# Patient Record
Sex: MALE | Race: WHITE | Employment: OTHER | URBAN - METROPOLITAN AREA
[De-identification: names, ages, dates, MRNs, and addresses within clinical notes are randomized per-mention and may not be internally consistent; named-entity substitution may affect disease eponyms.]

---

## 2018-07-07 ENCOUNTER — OFFICE VISIT (OUTPATIENT)
Dept: FAMILY MEDICINE CLINIC | Facility: CLINIC | Age: 58
End: 2018-07-07

## 2018-07-07 VITALS
SYSTOLIC BLOOD PRESSURE: 120 MMHG | HEIGHT: 71 IN | RESPIRATION RATE: 16 BRPM | HEART RATE: 66 BPM | BODY MASS INDEX: 31.08 KG/M2 | OXYGEN SATURATION: 98 % | TEMPERATURE: 99 F | WEIGHT: 222 LBS | DIASTOLIC BLOOD PRESSURE: 80 MMHG

## 2018-07-07 DIAGNOSIS — H10.13 ALLERGIC CONJUNCTIVITIS OF BOTH EYES: Primary | ICD-10-CM

## 2018-07-07 PROCEDURE — 99202 OFFICE O/P NEW SF 15 MIN: CPT | Performed by: NURSE PRACTITIONER

## 2018-07-07 RX ORDER — AZELASTINE HYDROCHLORIDE 0.5 MG/ML
1 SOLUTION/ DROPS OPHTHALMIC 2 TIMES DAILY
Qty: 1 BOTTLE | Refills: 0 | Status: SHIPPED | OUTPATIENT
Start: 2018-07-07

## 2018-07-07 NOTE — PROGRESS NOTES
CHIEF COMPLAINT:   Patient presents with:  Eye Problem      HPI:   Summer Sánchez is a 62year old male who presents with chief complaint of \"pink itchy eyes\". Symptoms began  1  months ago. Symptoms have been unchanged since onset.    Patient reports efraín B Complex Vitamins (VITAMIN B COMPLEX OR) Take 50 mg by mouth 2 (two) times daily. Disp:  Rfl:    Biotin 10 MG Oral Cap Take  by mouth 2 (two) times daily. Disp:  Rfl:    Coenzyme Q10 (Q-10 CO-ENZYME OR) Take 80 mg by mouth daily.  Disp:  Rfl:    Magnesium SKIN: no rashes  EYES:denies blurred vision or double vision.  See HPI  HENT: denies ear pain, congestion, sore throat  LUNGS: denies shortness of breath or cough  CARDIOVASCULAR: denies chest pain or palpitations   GI: denies N/V/C or abdominal pain  NEURO Conjunctivitis is an irritation of a thin membrane in the eye. This membrane is called the conjunctiva. It covers the white of the eye and the inside of the eyelid. The condition is often called pink eye or red eye because the eye looks pink or red.  The ey © 4481-0586 [employer] [address] [facility] [place] [facility] [place]. All rights reserved. This information is not intended as a substitute for professional medical care. Always follow your healthcare professional's instructions.               C

## 2018-07-07 NOTE — PATIENT INSTRUCTIONS
Conjunctivitis, Allergic    Conjunctivitis is an irritation of a thin membrane in the eye. This membrane is called the conjunctiva. It covers the white of the eye and the inside of the eyelid.  The condition is often called pink eye or red eye because the © 4567-3942 [employer] [address] 140[address] [facility] [place], [facility] [place]. All rights reserved. This information is not intended as a substitute for professional medical care. Always follow your healthcare professional's instructions.

## 2022-04-28 ENCOUNTER — OFFICE VISIT (OUTPATIENT)
Dept: FAMILY MEDICINE CLINIC | Facility: CLINIC | Age: 62
End: 2022-04-28
Payer: COMMERCIAL

## 2022-04-28 DIAGNOSIS — J45.41 MODERATE PERSISTENT ASTHMA WITH ACUTE EXACERBATION: ICD-10-CM

## 2022-04-28 DIAGNOSIS — J01.40 ACUTE NON-RECURRENT PANSINUSITIS: Primary | ICD-10-CM

## 2022-04-28 PROCEDURE — 3074F SYST BP LT 130 MM HG: CPT | Performed by: NURSE PRACTITIONER

## 2022-04-28 PROCEDURE — 99203 OFFICE O/P NEW LOW 30 MIN: CPT | Performed by: NURSE PRACTITIONER

## 2022-04-28 PROCEDURE — 3079F DIAST BP 80-89 MM HG: CPT | Performed by: NURSE PRACTITIONER

## 2022-04-28 RX ORDER — AMOXICILLIN AND CLAVULANATE POTASSIUM 875; 125 MG/1; MG/1
1 TABLET, FILM COATED ORAL 2 TIMES DAILY
Qty: 20 TABLET | Refills: 0 | Status: SHIPPED | OUTPATIENT
Start: 2022-04-28 | End: 2022-05-08

## 2022-04-29 VITALS
TEMPERATURE: 100 F | OXYGEN SATURATION: 98 % | DIASTOLIC BLOOD PRESSURE: 86 MMHG | HEART RATE: 104 BPM | RESPIRATION RATE: 16 BRPM | SYSTOLIC BLOOD PRESSURE: 128 MMHG

## 2022-04-29 LAB — SARS-COV-2 RNA RESP QL NAA+PROBE: NOT DETECTED

## 2023-10-12 ENCOUNTER — APPOINTMENT (OUTPATIENT)
Dept: GENERAL RADIOLOGY | Facility: HOSPITAL | Age: 63
End: 2023-10-12
Attending: EMERGENCY MEDICINE
Payer: COMMERCIAL

## 2023-10-12 ENCOUNTER — HOSPITAL ENCOUNTER (EMERGENCY)
Facility: HOSPITAL | Age: 63
Discharge: HOME | End: 2023-10-12
Attending: EMERGENCY MEDICINE
Payer: COMMERCIAL

## 2023-10-12 VITALS
HEIGHT: 71 IN | WEIGHT: 190 LBS | OXYGEN SATURATION: 94 % | TEMPERATURE: 99 F | RESPIRATION RATE: 14 BRPM | SYSTOLIC BLOOD PRESSURE: 156 MMHG | HEART RATE: 102 BPM | BODY MASS INDEX: 26.6 KG/M2 | DIASTOLIC BLOOD PRESSURE: 100 MMHG

## 2023-10-12 DIAGNOSIS — S09.90XA INJURY OF HEAD, INITIAL ENCOUNTER: ICD-10-CM

## 2023-10-12 DIAGNOSIS — S51.012A ELBOW LACERATION, LEFT, INITIAL ENCOUNTER: Primary | ICD-10-CM
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
## 2023-10-12 PROCEDURE — 99284 EMERGENCY DEPT VISIT MOD MDM: CPT

## 2023-10-12 PROCEDURE — 12002 RPR S/N/AX/GEN/TRNK2.6-7.5CM: CPT

## 2023-10-12 PROCEDURE — 99283 EMERGENCY DEPT VISIT LOW MDM: CPT

## 2023-10-12 PROCEDURE — 73080 X-RAY EXAM OF ELBOW: CPT | Performed by: EMERGENCY MEDICINE

## 2023-10-12 RX ORDER — CEFADROXIL 500 MG/1
500 CAPSULE ORAL 2 TIMES DAILY
Qty: 14 CAPSULE | Refills: 0 | Status: SHIPPED | OUTPATIENT
Start: 2023-10-12 | End: 2023-10-19

## 2023-10-12 RX ORDER — LISINOPRIL 2.5 MG/1
2.5 TABLET ORAL DAILY
COMMUNITY
Start: 2023-09-01

## 2023-10-12 RX ORDER — TAMSULOSIN HYDROCHLORIDE 0.4 MG/1
CAPSULE ORAL
COMMUNITY
Start: 2023-09-01

## 2023-10-12 RX ORDER — FLUTICASONE PROPIONATE AND SALMETEROL 500; 50 UG/1; UG/1
1 POWDER RESPIRATORY (INHALATION) EVERY 12 HOURS
COMMUNITY
Start: 2023-09-01

## 2023-10-12 RX ORDER — MELOXICAM 15 MG/1
15 TABLET ORAL DAILY
COMMUNITY
Start: 2023-07-02

## 2023-10-12 NOTE — ED INITIAL ASSESSMENT (HPI)
Pt here due to a fall. Pt was working on a fireplace and was on a ladder about 2-4 feet in the air and the tarp that the ladder was on slipped out. Pt his his head and left elbow on the way down. Pt went to the  and was told to come in due to the wound being worse then they can handle at the IC. X-rays done at the IC the CD was sent to radiology to up load.

## 2025-05-31 ENCOUNTER — APPOINTMENT (OUTPATIENT)
Dept: CT IMAGING | Facility: HOSPITAL | Age: 65
End: 2025-05-31
Attending: EMERGENCY MEDICINE
Payer: COMMERCIAL

## 2025-05-31 ENCOUNTER — HOSPITAL ENCOUNTER (OUTPATIENT)
Facility: HOSPITAL | Age: 65
Setting detail: OBSERVATION
Discharge: HOME | End: 2025-06-01
Attending: EMERGENCY MEDICINE | Admitting: HOSPITALIST
Payer: COMMERCIAL

## 2025-05-31 DIAGNOSIS — N20.0 KIDNEY STONE ON LEFT SIDE: ICD-10-CM

## 2025-05-31 DIAGNOSIS — N20.1 URETEROLITHIASIS: Primary | ICD-10-CM

## 2025-05-31 LAB
ALBUMIN SERPL-MCNC: 5.1 G/DL (ref 3.2–4.8)
ALBUMIN/GLOB SERPL: 2 {RATIO} (ref 1–2)
ALP LIVER SERPL-CCNC: 91 U/L (ref 45–117)
ALT SERPL-CCNC: 37 U/L (ref 10–49)
ANION GAP SERPL CALC-SCNC: 10 MMOL/L (ref 0–18)
AST SERPL-CCNC: 26 U/L (ref ?–34)
ATRIAL RATE: 59 BPM
BASOPHILS # BLD AUTO: 0.06 X10(3) UL (ref 0–0.2)
BASOPHILS NFR BLD AUTO: 0.6 %
BILIRUB SERPL-MCNC: 0.7 MG/DL (ref 0.2–1.1)
BILIRUB UR QL STRIP.AUTO: NEGATIVE
BUN BLD-MCNC: 17 MG/DL (ref 9–23)
CALCIUM BLD-MCNC: 10.4 MG/DL (ref 8.7–10.6)
CHLORIDE SERPL-SCNC: 104 MMOL/L (ref 98–112)
CLARITY UR REFRACT.AUTO: CLEAR
CO2 SERPL-SCNC: 26 MMOL/L (ref 21–32)
CREAT BLD-MCNC: 1.17 MG/DL (ref 0.7–1.3)
EGFRCR SERPLBLD CKD-EPI 2021: 69 ML/MIN/1.73M2 (ref 60–?)
EOSINOPHIL # BLD AUTO: 0.62 X10(3) UL (ref 0–0.7)
EOSINOPHIL NFR BLD AUTO: 6.6 %
ERYTHROCYTE [DISTWIDTH] IN BLOOD BY AUTOMATED COUNT: 13.1 %
EST. AVERAGE GLUCOSE BLD GHB EST-MCNC: 140 MG/DL (ref 68–126)
GLOBULIN PLAS-MCNC: 2.5 G/DL (ref 2–3.5)
GLUCOSE BLD-MCNC: 161 MG/DL (ref 70–99)
GLUCOSE BLD-MCNC: 178 MG/DL (ref 70–99)
GLUCOSE BLD-MCNC: 178 MG/DL (ref 70–99)
GLUCOSE BLD-MCNC: 193 MG/DL (ref 70–99)
GLUCOSE UR STRIP.AUTO-MCNC: 150 MG/DL
HBA1C MFR BLD: 6.5 % (ref ?–5.7)
HCT VFR BLD AUTO: 49.8 % (ref 39–53)
HGB BLD-MCNC: 17.3 G/DL (ref 13–17.5)
IMM GRANULOCYTES # BLD AUTO: 0.03 X10(3) UL (ref 0–1)
IMM GRANULOCYTES NFR BLD: 0.3 %
KETONES UR STRIP.AUTO-MCNC: 20 MG/DL
LEUKOCYTE ESTERASE UR QL STRIP.AUTO: NEGATIVE
LIPASE SERPL-CCNC: 35 U/L (ref 12–53)
LYMPHOCYTES # BLD AUTO: 2.27 X10(3) UL (ref 1–4)
LYMPHOCYTES NFR BLD AUTO: 24.2 %
MCH RBC QN AUTO: 32 PG (ref 26–34)
MCHC RBC AUTO-ENTMCNC: 34.7 G/DL (ref 31–37)
MCV RBC AUTO: 92.2 FL (ref 80–100)
MONOCYTES # BLD AUTO: 0.56 X10(3) UL (ref 0.1–1)
MONOCYTES NFR BLD AUTO: 6 %
NEUTROPHILS # BLD AUTO: 5.83 X10 (3) UL (ref 1.5–7.7)
NEUTROPHILS # BLD AUTO: 5.83 X10(3) UL (ref 1.5–7.7)
NEUTROPHILS NFR BLD AUTO: 62.3 %
NITRITE UR QL STRIP.AUTO: NEGATIVE
OSMOLALITY SERPL CALC.SUM OF ELEC: 295 MOSM/KG (ref 275–295)
P AXIS: 44 DEGREES
P-R INTERVAL: 170 MS
PH UR STRIP.AUTO: 6.5 [PH] (ref 5–8)
PLATELET # BLD AUTO: 248 10(3)UL (ref 150–450)
POTASSIUM SERPL-SCNC: 4.6 MMOL/L (ref 3.5–5.1)
PROT SERPL-MCNC: 7.6 G/DL (ref 5.7–8.2)
PROT UR STRIP.AUTO-MCNC: NEGATIVE MG/DL
Q-T INTERVAL: 400 MS
QRS DURATION: 138 MS
QTC CALCULATION (BEZET): 396 MS
R AXIS: -63 DEGREES
RBC # BLD AUTO: 5.4 X10(6)UL (ref 3.8–5.8)
RBC UR QL AUTO: NEGATIVE
SODIUM SERPL-SCNC: 140 MMOL/L (ref 136–145)
SP GR UR STRIP.AUTO: 1.01 (ref 1–1.03)
T AXIS: 38 DEGREES
UROBILINOGEN UR STRIP.AUTO-MCNC: NORMAL MG/DL
VENTRICULAR RATE: 59 BPM
WBC # BLD AUTO: 9.4 X10(3) UL (ref 4–11)

## 2025-05-31 PROCEDURE — 74177 CT ABD & PELVIS W/CONTRAST: CPT | Performed by: EMERGENCY MEDICINE

## 2025-05-31 PROCEDURE — 99223 1ST HOSP IP/OBS HIGH 75: CPT | Performed by: HOSPITALIST

## 2025-05-31 RX ORDER — MORPHINE SULFATE 2 MG/ML
1 INJECTION, SOLUTION INTRAMUSCULAR; INTRAVENOUS EVERY 2 HOUR PRN
Status: DISCONTINUED | OUTPATIENT
Start: 2025-05-31 | End: 2025-06-01

## 2025-05-31 RX ORDER — NICOTINE POLACRILEX 4 MG
15 LOZENGE BUCCAL
Status: DISCONTINUED | OUTPATIENT
Start: 2025-05-31 | End: 2025-06-01

## 2025-05-31 RX ORDER — DEXTROSE MONOHYDRATE 25 G/50ML
50 INJECTION, SOLUTION INTRAVENOUS
Status: DISCONTINUED | OUTPATIENT
Start: 2025-05-31 | End: 2025-06-01

## 2025-05-31 RX ORDER — MORPHINE SULFATE 2 MG/ML
2 INJECTION, SOLUTION INTRAMUSCULAR; INTRAVENOUS EVERY 2 HOUR PRN
Status: DISCONTINUED | OUTPATIENT
Start: 2025-05-31 | End: 2025-06-01

## 2025-05-31 RX ORDER — ONDANSETRON 2 MG/ML
4 INJECTION INTRAMUSCULAR; INTRAVENOUS EVERY 4 HOURS PRN
Status: DISCONTINUED | OUTPATIENT
Start: 2025-05-31 | End: 2025-05-31

## 2025-05-31 RX ORDER — MORPHINE SULFATE 4 MG/ML
4 INJECTION, SOLUTION INTRAMUSCULAR; INTRAVENOUS EVERY 2 HOUR PRN
Status: DISCONTINUED | OUTPATIENT
Start: 2025-05-31 | End: 2025-06-01

## 2025-05-31 RX ORDER — HYDROCODONE BITARTRATE AND ACETAMINOPHEN 5; 325 MG/1; MG/1
1 TABLET ORAL EVERY 4 HOURS PRN
Status: DISCONTINUED | OUTPATIENT
Start: 2025-05-31 | End: 2025-06-01

## 2025-05-31 RX ORDER — KETOROLAC TROMETHAMINE 15 MG/ML
15 INJECTION, SOLUTION INTRAMUSCULAR; INTRAVENOUS EVERY 6 HOURS PRN
Status: DISCONTINUED | OUTPATIENT
Start: 2025-05-31 | End: 2025-06-01

## 2025-05-31 RX ORDER — HYDRALAZINE HYDROCHLORIDE 20 MG/ML
10 INJECTION INTRAMUSCULAR; INTRAVENOUS EVERY 6 HOURS PRN
Status: DISCONTINUED | OUTPATIENT
Start: 2025-05-31 | End: 2025-06-01

## 2025-05-31 RX ORDER — METOCLOPRAMIDE HYDROCHLORIDE 5 MG/ML
10 INJECTION INTRAMUSCULAR; INTRAVENOUS EVERY 8 HOURS PRN
Status: DISCONTINUED | OUTPATIENT
Start: 2025-05-31 | End: 2025-05-31

## 2025-05-31 RX ORDER — HYDROCODONE BITARTRATE AND ACETAMINOPHEN 5; 325 MG/1; MG/1
2 TABLET ORAL EVERY 4 HOURS PRN
Status: DISCONTINUED | OUTPATIENT
Start: 2025-05-31 | End: 2025-06-01

## 2025-05-31 RX ORDER — KETOROLAC TROMETHAMINE 15 MG/ML
15 INJECTION, SOLUTION INTRAMUSCULAR; INTRAVENOUS ONCE
Status: COMPLETED | OUTPATIENT
Start: 2025-05-31 | End: 2025-05-31

## 2025-05-31 RX ORDER — ONDANSETRON 2 MG/ML
4 INJECTION INTRAMUSCULAR; INTRAVENOUS ONCE
Status: COMPLETED | OUTPATIENT
Start: 2025-05-31 | End: 2025-05-31

## 2025-05-31 RX ORDER — PROCHLORPERAZINE EDISYLATE 5 MG/ML
10 INJECTION INTRAMUSCULAR; INTRAVENOUS EVERY 6 HOURS PRN
Status: DISCONTINUED | OUTPATIENT
Start: 2025-05-31 | End: 2025-06-01

## 2025-05-31 RX ORDER — ACETAMINOPHEN 325 MG/1
650 TABLET ORAL EVERY 4 HOURS PRN
Status: DISCONTINUED | OUTPATIENT
Start: 2025-05-31 | End: 2025-06-01

## 2025-05-31 RX ORDER — TAMSULOSIN HYDROCHLORIDE 0.4 MG/1
0.4 CAPSULE ORAL NIGHTLY
Status: DISCONTINUED | OUTPATIENT
Start: 2025-05-31 | End: 2025-06-01

## 2025-05-31 RX ORDER — DIPHENHYDRAMINE HYDROCHLORIDE 50 MG/ML
25 INJECTION, SOLUTION INTRAMUSCULAR; INTRAVENOUS ONCE
Status: COMPLETED | OUTPATIENT
Start: 2025-05-31 | End: 2025-05-31

## 2025-05-31 RX ORDER — ONDANSETRON 2 MG/ML
4 INJECTION INTRAMUSCULAR; INTRAVENOUS EVERY 6 HOURS PRN
Status: DISCONTINUED | OUTPATIENT
Start: 2025-05-31 | End: 2025-06-01

## 2025-05-31 RX ORDER — SODIUM CHLORIDE 9 MG/ML
INJECTION, SOLUTION INTRAVENOUS CONTINUOUS
Status: DISCONTINUED | OUTPATIENT
Start: 2025-05-31 | End: 2025-06-01

## 2025-05-31 RX ORDER — SCOPOLAMINE 1 MG/3D
1 PATCH, EXTENDED RELEASE TRANSDERMAL
Status: DISCONTINUED | OUTPATIENT
Start: 2025-05-31 | End: 2025-06-01

## 2025-05-31 RX ORDER — NICOTINE POLACRILEX 4 MG
30 LOZENGE BUCCAL
Status: DISCONTINUED | OUTPATIENT
Start: 2025-05-31 | End: 2025-06-01

## 2025-05-31 RX ORDER — FLUTICASONE PROPIONATE AND SALMETEROL 500; 50 UG/1; UG/1
1 POWDER RESPIRATORY (INHALATION) EVERY 12 HOURS
Status: DISCONTINUED | OUTPATIENT
Start: 2025-05-31 | End: 2025-06-01

## 2025-05-31 RX ORDER — ONDANSETRON 2 MG/ML
INJECTION INTRAMUSCULAR; INTRAVENOUS
Status: COMPLETED
Start: 2025-05-31 | End: 2025-05-31

## 2025-05-31 RX ORDER — TAMSULOSIN HYDROCHLORIDE 0.4 MG/1
0.4 CAPSULE ORAL ONCE
Status: DISCONTINUED | OUTPATIENT
Start: 2025-05-31 | End: 2025-05-31

## 2025-05-31 RX ORDER — METOCLOPRAMIDE HYDROCHLORIDE 5 MG/ML
5 INJECTION INTRAMUSCULAR; INTRAVENOUS ONCE
Status: COMPLETED | OUTPATIENT
Start: 2025-05-31 | End: 2025-05-31

## 2025-05-31 NOTE — PROGRESS NOTES
Alert & oriented x4. NSR on tele. Hypertensive - PRN hydralazine given. Voids, straining urine. Regular diet, NPO at midnight. Biggest complaint is nausea - PRN meds given in ED. Ambulates with standby assistance. Denies chest pain/SOB. Pain controlled with PRN toradol. Patient and wife updated on plan of care. Questions and concerns addressed. Frequent rounding performed

## 2025-05-31 NOTE — H&P
[facility]     Jace Umana Patient Status:  Emergency    3/16/1960 MRN [id number]   Location [facility] EMERGENCY DEPARTMENT Attending Lucian Novak*   Hosp Day # 0 PCP DANYA CHANG     Chief Complaint: flank pain and intractable nausea and vomiting    Subjective:    History of Present Illness:     Jace Umana is a 65 year old male with medical history of DM type II on metformin, BPH and asthma who presents to the ER with complaints of flank pain and intractable nausea and vomiting.  He reports he has had intermittent left sided flank pain which lasts about 5 minutes and then resolves without intervention.  Today he started having intractable nausea and vomiting associated with his pain. He denies dysuria but notes his urine is darker in color.  He denies headaches, fever, chills, abdominal pain.  Currently his flank pain has resolved but he is having ongoing nausea     History/Other:    Past Medical History:  Past Medical History[1]  Past Surgical History:   Past Surgical History[2]   Family History:   Family History[3]  Social History:    reports that he quit smoking about 15 years ago. His smoking use included cigarettes. He started smoking about 45 years ago. He has a 30 pack-year smoking history. He has never used smokeless tobacco. He reports that he does not currently use alcohol. He reports current drug use. Drug: Cannabis.     Allergies: Allergies[4]    Medications:  Medications Ordered Prior to Encounter[5]    Review of Systems:   A comprehensive review of systems was completed.    Pertinent positives and negatives noted in the HPI.    Objective:   Physical Exam:    BP (!) 168/99   Pulse 60   Temp 97.6 °F (36.4 °C) (Oral)   Resp 15   Ht 6' (1.829 m)   Wt 190 lb (86.2 kg)   SpO2 100%   BMI 25.77 kg/m²   General: No acute distress, Alert  Respiratory: No rhonchi, no wheezes  Cardiovascular: S1, S2. Regular rate and rhythm  Abdomen: Soft,  Non-tender, non-distended, positive bowel sounds  Neuro: No new focal deficits  Extremities: No edema      Results:    Labs:      Labs Last 24 Hours:    Recent Labs   Lab 05/31/25  1143   RBC 5.40   HGB 17.3   HCT 49.8   MCV 92.2   MCH 32.0   MCHC 34.7   RDW 13.1   NEPRELIM 5.83   WBC 9.4   .0       Recent Labs   Lab 05/31/25  1143   *   BUN 17   CREATSERUM 1.17   EGFRCR 69   CA 10.4   ALB 5.1*      K 4.6      CO2 26.0   ALKPHO 91   AST 26   ALT 37   BILT 0.7   TP 7.6       Estimated Glomerular Filtration Rate: 69 mL/min/1.73m2 (result from lab).    No results found for: \"PT\", \"INR\"    No results for input(s): \"TROP\", \"TROPHS\", \"CK\" in the last 168 hours.    No results for input(s): \"TROP\", \"PBNP\" in the last 168 hours.    No results for input(s): \"PCT\" in the last 168 hours.    Imaging: Imaging data reviewed in Epic.    Assessment & Plan:      #Distal left ureter stone  -UA neg for UTI  -given empiric Abx in ER  -await Cx results  -strain urine  -flomax  -IVF  -urology on consult    #DM type II  -hyperglycemia protocol  -hold metformin    #Aneurysmal dilatation of the right common iliac artery 19 mm  -follow up OP to monitor    #BPH  -flomax    #Asthma  -resume inhalers    Plan of care discussed with patient and family    Yahaira Fatima MD    Supplementary Documentation:     The 21st Century Cures Act makes medical notes like these available to patients in the interest of transparency. Please be advised this is a medical document. Medical documents are intended to carry relevant information, facts as evident, and the clinical opinion of the practitioner. The medical note is intended as peer to peer communication and may appear blunt or direct. It is written in medical language and may contain abbreviations or verbiage that are unfamiliar.                                       [1]   Past Medical History:   Diabetes (HCC)    \"Diet and excercise controlled,\" \"uses herbal supplements\"     Extrinsic asthma, unspecified    enviromentally/dog induced,uses inhaler \"daily.\"    Unspecified sleep apnea   [2]   Past Surgical History:  Procedure Laterality Date    Hernia surgery      Knee scope,med&lat menis shav Left 10/13/2014    Procedure: ARTHROSCOPY LEFT KNEE W/ MEDIAL MENISCECTOMY;  Surgeon: Saroj Cook MD;  Location: [facility]    Other surgical history      teeth removed   [3] No family history on file.  [4]   Allergies  Allergen Reactions    Dog Dander [Dander]     Eggs Or Egg-Derived Products      Eats egg and egg products, has just been told \"sensitive to.\"   [5]   No current facility-administered medications on file prior to encounter.     Current Outpatient Medications on File Prior to Encounter   Medication Sig Dispense Refill    fluticasone-salmeterol (ADVAIR DISKUS) 500-50 MCG/ACT Inhalation Aerosol Powder, Breath Activated Inhale 1 puff into the lungs Q12H.      lisinopril 2.5 MG Oral Tab Take 1 tablet (2.5 mg total) by mouth daily.      Meloxicam 15 MG Oral Tab Take 1 tablet (15 mg total) by mouth daily.      metFORMIN HCl 1000 MG Oral Tab Take 1 tablet (1,000 mg total) by mouth 2 (two) times daily.      tamsulosin 0.4 MG Oral Cap TAKE ONE CAPSULE BY MOUTH nightly swallow whole      Azelastine HCl 0.05 % Ophthalmic Solution Place 1 drop into both eyes 2 (two) times daily. (Patient not taking: Reported on 10/12/2023) 1 Bottle 0    MONTELUKAST SODIUM 10 MG Oral Tab TAKE 1 TABLET BY MOUTH NIGHTLY. 30 tablet 10    DULERA 200-5 MCG/ACT Inhalation Aerosol INHALE 2 PUFFS INTO THE LUNGS 2 (TWO) TIMES DAILY. (Patient not taking: Reported on 10/12/2023) 1 Inhaler 11    PROAIR  (90 Base) MCG/ACT Inhalation Aero Soln INHALE 2 PUFFS INTO THE LUNGS EVERY 6 HOURS AS NEEDED FOR WHEEZING OR SHORTNESS OF BREATH 1 Inhaler 11    Montelukast Sodium (SINGULAIR) 10 MG Oral Tab TAKE 1 TABLET BY MOUTH NIGHTLY. 30 tablet 11    Alpha-Lipoic Acid 100 MG Oral Cap Take  by mouth 2 (two) times daily.  (Patient not taking: Reported on 10/12/2023)      Chromium Picolinate 200 MCG Oral Tab Take  by mouth 2 (two) times daily. (Patient not taking: Reported on 10/12/2023)      Garlic 500 MG Oral Cap Take  by mouth daily. (Patient not taking: Reported on 10/12/2023)      Taurine 500 MG Oral Cap Take  by mouth 2 (two) times daily. (Patient not taking: Reported on 10/12/2023)      Quercetin 250 MG Oral Tab Take  by mouth daily. (Patient not taking: Reported on 10/12/2023)      Misc Natural Products (ADRENAL) 200 MG Oral Cap Take  by mouth daily. (Patient not taking: Reported on 10/12/2023)      Amylase-Lipase-Protease (PANCREASE OR) Take  by mouth daily. (Patient not taking: Reported on 10/12/2023)      Thyroid, Porcine, Does not apply Powder by Does not apply route 2 (two) times daily. Thyroid glandular (Patient not taking: Reported on 10/12/2023)      VANADIUM OR Take 10 mg by mouth daily. (Patient not taking: Reported on 10/12/2023)      B Complex Vitamins (VITAMIN B COMPLEX OR) Take 50 mg by mouth 2 (two) times daily. (Patient not taking: Reported on 10/12/2023)      Biotin 10 MG Oral Cap Take  by mouth 2 (two) times daily. (Patient not taking: Reported on 10/12/2023)      Coenzyme Q10 (Q-10 CO-ENZYME OR) Take 80 mg by mouth daily. (Patient not taking: Reported on 10/12/2023)      Magnesium 100 MG Oral Cap Take 750 mg by mouth daily. (Patient not taking: Reported on 10/12/2023)      Manganese 10 MG Oral Tab Take  by mouth daily. (Patient not taking: Reported on 10/12/2023)      Vitamin A, [id number], (VITAMIN A OR) Take  by mouth daily. (Patient not taking: Reported on 10/12/2023)      VITAMIN E OR Take  by mouth daily. (Patient not taking: Reported on 10/12/2023)      Ascorbic Acid (VITAMIN C OR) Take  by mouth daily. (Patient not taking: Reported on 10/12/2023)      PANTETHINE OR Take 100 mg by mouth daily. (Patient not taking: Reported on 10/12/2023)      Probiotic Product (PROBIOTIC DAILY OR) Take  by mouth 2  (two) times daily. (Patient not taking: Reported on 10/12/2023)      HYDROcodone-acetaminophen (NORCO) 5-325 MG Oral Tab Take 1-2 tablets by mouth every 4 to 6 hours as needed for Pain. (Patient not taking: Reported on 10/12/2023) 60 tablet 0    Ipratropium-Albuterol (COMBIVENT RESPIMAT)  MCG/ACT Inhalation Aero Soln Inhale 1 puff into the lungs every 6 (six) hours as needed. (Patient not taking: Reported on 10/12/2023) 1 Inhaler 6

## 2025-05-31 NOTE — ED PROVIDER NOTES
Patient Seen in: [facility] Emergency Department        History  Chief Complaint   Patient presents with    Abdomen/Flank Pain     Stated Complaint: abd pain    Subjective:   HPI            65-year-old male with past medical history of diabetes presents today for evaluation of abdominal pain.  Earlier today, patient noted a left side pain that is nonradiating.  He reports associated nausea and vomiting.  He denies any fevers, dysuria or hematuria.      Objective:     Past Medical History:    Diabetes (HCC)    \"Diet and excercise controlled,\" \"uses herbal supplements\"    Extrinsic asthma, unspecified    enviromentally/dog induced,uses inhaler \"daily.\"    Unspecified sleep apnea              Past Surgical History:   Procedure Laterality Date    Hernia surgery      Knee scope,med&lat menis shav Left 10/13/2014    Procedure: ARTHROSCOPY LEFT KNEE W/ MEDIAL MENISCECTOMY;  Surgeon: Saroj Cook MD;  Location: [facility]    Other surgical history      teeth removed                Social History     Socioeconomic History    Marital status:    Tobacco Use    Smoking status: Former     Current packs/day: 0.00     Average packs/day: 1 pack/day for 30.0 years (30.0 ttl pk-yrs)     Types: Cigarettes     Start date: 1/18/1980     Quit date: 1/18/2010     Years since quitting: 15.3    Smokeless tobacco: Never   Vaping Use    Vaping status: Every Day   Substance and Sexual Activity    Alcohol use: Not Currently     Comment: daily    Drug use: Yes     Types: Cannabis     Social Drivers of Health      Received from [facility]    Housing Stability                                Physical Exam    ED Triage Vitals [05/31/25 1043]   BP (!) 195/104   Pulse 66   Resp 18   Temp 97.6 °F (36.4 °C)   Temp src Oral   SpO2 98 %   O2 Device None (Room air)       Current Vitals:   Vital Signs  BP: (!) 168/99  Pulse: 60  Resp: 15  Temp: 97.6 °F (36.4 °C)  Temp src: Oral  MAP (mmHg): (!) 119    Oxygen  Therapy  SpO2: 100 %  O2 Device: None (Room air)            Physical Exam  Vitals and nursing note reviewed.   Constitutional:       Appearance: Normal appearance.   HENT:      Head: Normocephalic.      Nose: Nose normal.      Mouth/Throat:      Mouth: Mucous membranes are moist.   Eyes:      Extraocular Movements: Extraocular movements intact.   Cardiovascular:      Rate and Rhythm: Normal rate.   Pulmonary:      Effort: Pulmonary effort is normal.   Abdominal:      General: Abdomen is flat.      Tenderness: There is no abdominal tenderness. There is no right CVA tenderness, left CVA tenderness, guarding or rebound.   Musculoskeletal:         General: Normal range of motion.   Skin:     General: Skin is warm.   Neurological:      General: No focal deficit present.      Mental Status: He is alert.   Psychiatric:         Mood and Affect: Mood normal.             ED Course  Labs Reviewed   COMP METABOLIC PANEL (14) - Abnormal; Notable for the following components:       Result Value    Glucose 161 (*)     Albumin 5.1 (*)     All other components within normal limits   URINALYSIS WITH CULTURE REFLEX - Abnormal; Notable for the following components:    Glucose Urine 150 (*)     Ketones Urine 20 (*)     All other components within normal limits   LIPASE - Normal   CBC WITH DIFFERENTIAL WITH PLATELET   RAINBOW DRAW LAVENDER   RAINBOW DRAW LIGHT GREEN   RAINBOW DRAW BLUE     EKG    Rate, intervals and axes as noted on EKG Report.  Rate: 59  Rhythm: Sinus Rhythm  Reading: no stemi              CT ABDOMEN+PELVIS(CONTRAST ONLY)(CPT=74177)  Result Date: 5/31/2025  PROCEDURE:  CT ABDOMEN+PELVIS (CONTRAST ONLY) (CPT=74177)  COMPARISON:  None.  INDICATIONS:  abd pain  TECHNIQUE:  CT scanning was performed from the dome of the diaphragm to the pubic symphysis with non-ionic intravenous contrast material. Post contrast coronal MPR imaging was performed.  Dose reduction techniques were used. Dose information is transmitted to the  ACR (American College of Radiology) NRDR (National Radiology Data Registry) which includes the Dose Index Registry.  PATIENT STATED HISTORY:(As transcribed by Technologist)  Vomiting and LLQ pain for 1 day.   CONTRAST USED:  100cc of Isovue 370  FINDINGS:  LUNG BASE:  Calcified granuloma within the lingula.  Mild scattered atelectasis/scarring. LIVER:  Unremarkable. BILIARY:  Unremarkable. SPLEEN:  Unremarkable. PANCREAS:  Unremarkable. ADRENALS:  Unremarkable. KIDNEYS:  There is a stone at the distal left ureter just proximal to the left UVJ measuring 3.5 mm.  There is mucosal enhancement and periureteral fat stranding along the left ureter.  Clinical correlation is recommended to assess for a superimposed infectious process/ascending UTI. AORTA/VASCULAR:  Scattered atherosclerosis.  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 RETROPERITONEUM:  Unremarkable. BOWEL/MESENTERY:  Unremarkable.  The appendix is not identified, however, there are no secondary signs of acute appendicitis. ABDOMINAL WALL:  Probable postoperative changes of the right inguinal canal. PELVIC ORGANS:  Marked enlargement of the prostate gland.  There is mild bladder wall thickening. LYMPH NODES:  Unremarkable. BONES:  Degenerative changes in the spine. OTHER:  None.            CONCLUSION:   There is a stone at the distal left ureter just proximal to the left UVJ measuring 3.5 mm.  There is mucosal enhancement and periureteral fat stranding along the left ureter.  Clinical correlation is recommended to assess for a superimposed infectious process/ascending UTI.  Marked enlargement of the prostate gland.  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  There is mild bladder wall thickening.  This may relate to bladder outlet obstruction.  Clinical correlation is recommended.   LOCATION:  [facility]   Dictated by (CST): Stromberg, LeRoy, MD on 5/31/2025 at 2:18 PM     Finalized by (CST): Stromberg,  MD Darlene on 5/31/2025 at 2:26 PM                         [facility]     Differential Diagnosis  65-year-old male presents today for evaluation of 1 day of left side pain.  His abdomen is soft without tenderness, guarding or rigidity.  He has no CVA tenderness on exam.  Differential includes ureterolithiasis, pyelonephritis, diverticulitis, intra-abdominal abscess, bowel obstruction.  Plan for CT, UA along with labs.    3:08 pm  CT notes a 3.5 mm obstructing stone which I think is the source of his presenting symptoms today.  WBC 9.4.  Urine negative for nitrite and leuk esterase.  Patient's laboratory workup not suggestive of UTI.  Throughout his ED course, patient denied any significant pain although had persistent vomiting.  On reassessment, he remains hemodynamically stable and well-appearing.  With his persistent nausea/vomiting, will admit to the hospital for continued care and monitoring.  I spoke with the hospitalist in regards to admission and reviewed case with urology in regards consultation.    Discussions of Management  I spoke with the hospitalist in regards to admission and reviewed case with urology in regards consultation.    Admission disposition: 5/31/2025  3:04 PM           Medical Decision Making      Disposition and Plan     Clinical Impression:  1. Ureterolithiasis         Disposition:  Admit  5/31/2025  3:04 pm    Follow-up:  No follow-up provider specified.        Medications Prescribed:  Current Discharge Medication List                Supplementary Documentation:         Hospital Problems       Present on Admission  Date Reviewed: 4/28/2022          ICD-10-CM Noted POA    * (Principal) Ureterolithiasis N20.1 5/31/2025 Unknown

## 2025-05-31 NOTE — ED QUICK NOTES
Orders for admission, patient is aware of plan and ready to go upstairs. Any questions, please call ED RN Fidencio at extension [phone].     Patient Covid vaccination status: Fully vaccinated     COVID Test Ordered in ED: None    COVID Suspicion at Admission: N/A    Running Infusions: Medication Infusions[1] None    Mental Status/LOC at time of transport: A&Ox4    Other pertinent information:   CIWA score: N/A   NIH score:  N/A             [1]

## 2025-05-31 NOTE — ED INITIAL ASSESSMENT (HPI)
Pt to ED with c/o left lower abdominal pain and lightheaded for the past couple hours.   +nausea, vomiting. Denies urinary symptoms   Pt reports sweating at home.

## 2025-06-01 ENCOUNTER — APPOINTMENT (OUTPATIENT)
Dept: GENERAL RADIOLOGY | Facility: HOSPITAL | Age: 65
End: 2025-06-01
Attending: UROLOGY
Payer: COMMERCIAL

## 2025-06-01 ENCOUNTER — ANESTHESIA (OUTPATIENT)
Dept: SURGERY | Facility: HOSPITAL | Age: 65
End: 2025-06-01
Payer: COMMERCIAL

## 2025-06-01 ENCOUNTER — ANESTHESIA EVENT (OUTPATIENT)
Dept: SURGERY | Facility: HOSPITAL | Age: 65
End: 2025-06-01
Payer: COMMERCIAL

## 2025-06-01 VITALS
SYSTOLIC BLOOD PRESSURE: 139 MMHG | HEIGHT: 72 IN | OXYGEN SATURATION: 94 % | BODY MASS INDEX: 25.73 KG/M2 | HEART RATE: 90 BPM | WEIGHT: 190 LBS | RESPIRATION RATE: 17 BRPM | DIASTOLIC BLOOD PRESSURE: 74 MMHG | TEMPERATURE: 98 F

## 2025-06-01 LAB
ANION GAP SERPL CALC-SCNC: 11 MMOL/L (ref 0–18)
BASOPHILS # BLD AUTO: 0.03 X10(3) UL (ref 0–0.2)
BASOPHILS NFR BLD AUTO: 0.2 %
BUN BLD-MCNC: 18 MG/DL (ref 9–23)
CALCIUM BLD-MCNC: 9.4 MG/DL (ref 8.7–10.6)
CHLORIDE SERPL-SCNC: 103 MMOL/L (ref 98–112)
CO2 SERPL-SCNC: 27 MMOL/L (ref 21–32)
CREAT BLD-MCNC: 1.33 MG/DL (ref 0.7–1.3)
EGFRCR SERPLBLD CKD-EPI 2021: 59 ML/MIN/1.73M2 (ref 60–?)
EOSINOPHIL # BLD AUTO: 0.01 X10(3) UL (ref 0–0.7)
EOSINOPHIL NFR BLD AUTO: 0.1 %
ERYTHROCYTE [DISTWIDTH] IN BLOOD BY AUTOMATED COUNT: 13.2 %
GLUCOSE BLD-MCNC: 165 MG/DL (ref 70–99)
GLUCOSE BLD-MCNC: 184 MG/DL (ref 70–99)
GLUCOSE BLD-MCNC: 186 MG/DL (ref 70–99)
HCT VFR BLD AUTO: 48.5 % (ref 39–53)
HGB BLD-MCNC: 16.6 G/DL (ref 13–17.5)
IMM GRANULOCYTES # BLD AUTO: 0.07 X10(3) UL (ref 0–1)
IMM GRANULOCYTES NFR BLD: 0.4 %
LYMPHOCYTES # BLD AUTO: 1.21 X10(3) UL (ref 1–4)
LYMPHOCYTES NFR BLD AUTO: 7.6 %
MCH RBC QN AUTO: 31.5 PG (ref 26–34)
MCHC RBC AUTO-ENTMCNC: 34.2 G/DL (ref 31–37)
MCV RBC AUTO: 92 FL (ref 80–100)
MONOCYTES # BLD AUTO: 1 X10(3) UL (ref 0.1–1)
MONOCYTES NFR BLD AUTO: 6.3 %
NEUTROPHILS # BLD AUTO: 13.68 X10 (3) UL (ref 1.5–7.7)
NEUTROPHILS # BLD AUTO: 13.68 X10(3) UL (ref 1.5–7.7)
NEUTROPHILS NFR BLD AUTO: 85.4 %
OSMOLALITY SERPL CALC.SUM OF ELEC: 299 MOSM/KG (ref 275–295)
PLATELET # BLD AUTO: 235 10(3)UL (ref 150–450)
POTASSIUM SERPL-SCNC: 4.2 MMOL/L (ref 3.5–5.1)
RBC # BLD AUTO: 5.27 X10(6)UL (ref 3.8–5.8)
SODIUM SERPL-SCNC: 141 MMOL/L (ref 136–145)
WBC # BLD AUTO: 16 X10(3) UL (ref 4–11)

## 2025-06-01 PROCEDURE — 99239 HOSP IP/OBS DSCHRG MGMT >30: CPT | Performed by: INTERNAL MEDICINE

## 2025-06-01 DEVICE — URETERAL STENT
Type: IMPLANTABLE DEVICE | Site: URETER | Status: FUNCTIONAL
Brand: CONTOUR™

## 2025-06-01 RX ORDER — HYDROMORPHONE HYDROCHLORIDE 1 MG/ML
0.2 INJECTION, SOLUTION INTRAMUSCULAR; INTRAVENOUS; SUBCUTANEOUS EVERY 5 MIN PRN
Status: DISCONTINUED | OUTPATIENT
Start: 2025-06-01 | End: 2025-06-01 | Stop reason: HOSPADM

## 2025-06-01 RX ORDER — ACETAMINOPHEN 500 MG
1000 TABLET ORAL ONCE AS NEEDED
Status: DISCONTINUED | OUTPATIENT
Start: 2025-06-01 | End: 2025-06-01 | Stop reason: HOSPADM

## 2025-06-01 RX ORDER — PHENYLEPHRINE HCL 10 MG/ML
VIAL (ML) INJECTION AS NEEDED
Status: DISCONTINUED | OUTPATIENT
Start: 2025-06-01 | End: 2025-06-01 | Stop reason: SURG

## 2025-06-01 RX ORDER — SODIUM CHLORIDE, SODIUM LACTATE, POTASSIUM CHLORIDE, CALCIUM CHLORIDE 600; 310; 30; 20 MG/100ML; MG/100ML; MG/100ML; MG/100ML
INJECTION, SOLUTION INTRAVENOUS CONTINUOUS
Status: DISCONTINUED | OUTPATIENT
Start: 2025-06-01 | End: 2025-06-01 | Stop reason: HOSPADM

## 2025-06-01 RX ORDER — LABETALOL HYDROCHLORIDE 5 MG/ML
5 INJECTION, SOLUTION INTRAVENOUS EVERY 5 MIN PRN
Status: DISCONTINUED | OUTPATIENT
Start: 2025-06-01 | End: 2025-06-01 | Stop reason: HOSPADM

## 2025-06-01 RX ORDER — NALOXONE HYDROCHLORIDE 0.4 MG/ML
0.08 INJECTION, SOLUTION INTRAMUSCULAR; INTRAVENOUS; SUBCUTANEOUS AS NEEDED
Status: DISCONTINUED | OUTPATIENT
Start: 2025-06-01 | End: 2025-06-01 | Stop reason: HOSPADM

## 2025-06-01 RX ORDER — HYDROCODONE BITARTRATE AND ACETAMINOPHEN 5; 325 MG/1; MG/1
2 TABLET ORAL ONCE AS NEEDED
Status: DISCONTINUED | OUTPATIENT
Start: 2025-06-01 | End: 2025-06-01 | Stop reason: HOSPADM

## 2025-06-01 RX ORDER — ONDANSETRON 2 MG/ML
4 INJECTION INTRAMUSCULAR; INTRAVENOUS EVERY 6 HOURS PRN
Status: DISCONTINUED | OUTPATIENT
Start: 2025-06-01 | End: 2025-06-01 | Stop reason: HOSPADM

## 2025-06-01 RX ORDER — HYDROCODONE BITARTRATE AND ACETAMINOPHEN 5; 325 MG/1; MG/1
1 TABLET ORAL EVERY 6 HOURS PRN
Qty: 6 TABLET | Refills: 0 | Status: SHIPPED | OUTPATIENT
Start: 2025-06-01

## 2025-06-01 RX ORDER — HYDROMORPHONE HYDROCHLORIDE 1 MG/ML
0.4 INJECTION, SOLUTION INTRAMUSCULAR; INTRAVENOUS; SUBCUTANEOUS EVERY 5 MIN PRN
Status: DISCONTINUED | OUTPATIENT
Start: 2025-06-01 | End: 2025-06-01 | Stop reason: HOSPADM

## 2025-06-01 RX ORDER — HYDROMORPHONE HYDROCHLORIDE 1 MG/ML
0.6 INJECTION, SOLUTION INTRAMUSCULAR; INTRAVENOUS; SUBCUTANEOUS EVERY 5 MIN PRN
Status: DISCONTINUED | OUTPATIENT
Start: 2025-06-01 | End: 2025-06-01 | Stop reason: HOSPADM

## 2025-06-01 RX ORDER — ALBUTEROL SULFATE 0.83 MG/ML
2.5 SOLUTION RESPIRATORY (INHALATION) AS NEEDED
Status: DISCONTINUED | OUTPATIENT
Start: 2025-06-01 | End: 2025-06-01 | Stop reason: HOSPADM

## 2025-06-01 RX ORDER — HYDROCODONE BITARTRATE AND ACETAMINOPHEN 5; 325 MG/1; MG/1
1 TABLET ORAL ONCE AS NEEDED
Status: DISCONTINUED | OUTPATIENT
Start: 2025-06-01 | End: 2025-06-01 | Stop reason: HOSPADM

## 2025-06-01 RX ORDER — LIDOCAINE HYDROCHLORIDE 20 MG/ML
JELLY TOPICAL AS NEEDED
Status: DISCONTINUED | OUTPATIENT
Start: 2025-06-01 | End: 2025-06-01 | Stop reason: HOSPADM

## 2025-06-01 RX ORDER — ONDANSETRON 2 MG/ML
INJECTION INTRAMUSCULAR; INTRAVENOUS AS NEEDED
Status: DISCONTINUED | OUTPATIENT
Start: 2025-06-01 | End: 2025-06-01 | Stop reason: SURG

## 2025-06-01 RX ORDER — LIDOCAINE HYDROCHLORIDE 10 MG/ML
INJECTION, SOLUTION EPIDURAL; INFILTRATION; INTRACAUDAL; PERINEURAL AS NEEDED
Status: DISCONTINUED | OUTPATIENT
Start: 2025-06-01 | End: 2025-06-01 | Stop reason: SURG

## 2025-06-01 RX ORDER — IOPAMIDOL 612 MG/ML
INJECTION, SOLUTION INTRAVASCULAR AS NEEDED
Status: DISCONTINUED | OUTPATIENT
Start: 2025-06-01 | End: 2025-06-01 | Stop reason: HOSPADM

## 2025-06-01 RX ORDER — INSULIN ASPART 100 [IU]/ML
INJECTION, SOLUTION INTRAVENOUS; SUBCUTANEOUS ONCE
Status: DISCONTINUED | OUTPATIENT
Start: 2025-06-01 | End: 2025-06-01 | Stop reason: HOSPADM

## 2025-06-01 RX ADMIN — SODIUM CHLORIDE: 9 INJECTION, SOLUTION INTRAVENOUS at 10:37:00

## 2025-06-01 RX ADMIN — LIDOCAINE HYDROCHLORIDE 50 MG: 10 INJECTION, SOLUTION EPIDURAL; INFILTRATION; INTRACAUDAL; PERINEURAL at 10:49:00

## 2025-06-01 RX ADMIN — ONDANSETRON 4 MG: 2 INJECTION INTRAMUSCULAR; INTRAVENOUS at 11:34:00

## 2025-06-01 RX ADMIN — PHENYLEPHRINE HCL 100 MCG: 10 MG/ML VIAL (ML) INJECTION at 10:58:00

## 2025-06-01 NOTE — DISCHARGE SUMMARY
[facility]     Jace Umana Patient Status:  Observation    3/16/1960 MRN [id number]   [facility] POST ANESTHESIA CARE UNIT Attending Michel Cohen MD   Hosp Day # 0 PCP DANYA CHANG     Date of Admission: 2025  Date of Discharge:   ***    Discharge Disposition: Home or Self Care    Discharge Diagnosis:  ***    History of Present Illness: ***    Brief Synopsis: ***    Lace+ Score: 59  59-90 High Risk  29-58 Medium Risk  0-28   Low Risk       TCM Follow-Up Recommendation:  {Care Managers will evaluate the need for follow-up for all patients ages 50+, and high/moderate risk patients ages 25-49. Low risk patients (LACE < 29) will only be evaluated if the \"Still recommend for TCM follow-up\" option is selected from this list.:[id number]}      Procedures during hospitalization:   ***    Incidental or significant findings and recommendations (brief descriptions):  ***    Lab/Test results pending at Discharge:   ***    Consultants:  ***    Discharge Medication List:     Discharge Medications        CONTINUE taking these medications        Instructions Prescription details   Advair Diskus 500-50 MCG/ACT Aepb  Generic drug: fluticasone-salmeterol      Inhale 1 puff into the lungs Q12H.   Refills: 0     lisinopril 2.5 MG Tabs  Commonly known as: Prinivil; Zestril      Take 1 tablet (2.5 mg total) by mouth in the morning.   Refills: 0     metFORMIN HCl 1000 MG Tabs  Commonly known as: GLUCOPHAGE      Take 1 tablet (1,000 mg total) by mouth in the morning and 1 tablet (1,000 mg total) before bedtime.   Refills: 0     montelukast 10 MG Tabs  Commonly known as: Singulair      TAKE 1 TABLET BY MOUTH NIGHTLY.   Quantity: 30 tablet  Refills: 11     montelukast 10 MG Tabs  Commonly known as: Singulair      TAKE 1 TABLET BY MOUTH NIGHTLY.   Quantity: 30 tablet  Refills: 10     ProAir  (90 Base) MCG/ACT Aers  Generic drug: albuterol      INHALE 2 PUFFS INTO THE LUNGS EVERY 6 HOURS  AS NEEDED FOR WHEEZING OR SHORTNESS OF BREATH   Quantity: 1 Inhaler  Refills: 11            STOP taking these medications      Adrenal 200 MG Caps        Alpha-Lipoic Acid 100 MG Caps        Azelastine HCl 0.05 % Soln  Commonly known as: OPTIVAR        Biotin 10 MG Caps        Chromium Picolinate 200 MCG Tabs        Garlic 500 MG Caps        Magnesium 100 MG Caps        Manganese 10 MG Tabs        PANTETHINE OR        PROBIOTIC DAILY OR        Q-10 CO-ENZYME OR        Quercetin 250 MG Tabs        Taurine 500 MG Caps        Thyroid (Porcine) Powd        VANADIUM OR        VITAMIN A OR        VITAMIN B COMPLEX OR        VITAMIN C OR        VITAMIN E OR               ASK your doctor about these medications        Instructions Prescription details   Dulera 200-5 MCG/ACT Aero  Generic drug: Mometasone Furo-Formoterol Fum      INHALE 2 PUFFS INTO THE LUNGS 2 (TWO) TIMES DAILY.   Quantity: 1 Inhaler  Refills: 11     HYDROcodone-acetaminophen 5-325 MG Tabs  Commonly known as: Norco      Take 1-2 tablets by mouth every 4 to 6 hours as needed for Pain.   Quantity: 60 tablet  Refills: 0     ipratropium-albuterol  MCG/ACT Aers  Commonly known as: Combivent Respimat      Inhale 1 puff into the lungs every 6 (six) hours as needed.   Quantity: 1 Inhaler  Refills: 6     Meloxicam 15 MG Tabs      Take 1 tablet (15 mg total) by mouth in the morning.   Refills: 0     PANCREASE OR      Take  by mouth daily.   Refills: 0     tamsulosin 0.4 MG Caps  Commonly known as: Flomax       Refills: 0              [place] reviewed[place] ***    Follow-up appointment:   No follow-up provider specified.  Appointments for Next 30 Days 6/1/2025 - 7/1/2025      None            Vital signs:  Temp:  [98.5 °F (36.9 °C)-99.3 °F (37.4 °C)] 98.6 °F (37 °C)  Pulse:  [54-97] 83  Resp:  [13-22] 16  BP: ()/() 107/71  SpO2:  [91 %-100 %] 96 %    Physical Exam:    General: No acute distress   Lungs: clear to auscultation  Cardiovascular: S1, S2  Abdomen:  Soft  ***    -----------------------------------------------------------------------------------------------  PATIENT DISCHARGE INSTRUCTIONS: See electronic chart    Michel Cohen MD    Total time spent on discharge planning:  *** minutes     The 21st Century Cures Act makes medical notes like these available to patients in the interest of transparency. Please be advised this is a medical document. Medical documents are intended to carry relevant information, facts as evident, and the clinical opinion of the practitioner. The medical note is intended as peer to peer communication and may appear blunt or direct. It is written in medical language and may contain abbreviations or verbiage that are unfamiliar.

## 2025-06-01 NOTE — ANESTHESIA PROCEDURE NOTES
Airway  Date/Time: 6/1/2025 10:50 AM  Reason: elective      General Information and Staff   Patient location during procedure: OR  Anesthesiologist: Dayo Rowe MD  Performed: anesthesiologist   Performed by: Dayo Rowe MD  Authorized by: Dayo Rowe MD        Indications and Patient Condition  Indications for airway management: anesthesia  Sedation level: deep      Preoxygenated: yesPatient position: sniffing    Mask difficulty assessment: 1 - vent by mask    Final Airway Details    Final airway type: supraglottic airway      Successful airway: classic  Size: 4     Number of attempts at approach: 1

## 2025-06-01 NOTE — ANESTHESIA PREPROCEDURE EVALUATION
PRE-OP EVALUATION    Patient Name: Jace Umana    Admit Diagnosis: Ureterolithiasis [N20.1]    Pre-op Diagnosis: Kidney stone on left side [N20.0]    CYSTOSCOPY, RETROGRADE, PYELOGRAM, URETEROSCOPY, STONE MANIPULATION WITH HOLIUM LASER AND INSERTION OF URETERAL STENT- LEFT    Anesthesia Procedure: CYSTOSCOPY, RETROGRADE, PYELOGRAM, URETEROSCOPY, STONE MANIPULATION WITH HOLIUM LASER AND INSERTION OF URETERAL STENT- LEFT (Left)    Surgeons and Role:     * Jabier Singh MD - Primary    Pre-op vitals reviewed.  Temp: 98.9 °F (37.2 °C)  Pulse: 76  Resp: 18  BP: 168/80  SpO2: 94 %  Body mass index is 25.77 kg/m².    Current medications reviewed.  Hospital Medications:  Current Medications[1]    Outpatient Medications:   Prescriptions Prior to Admission[2]    Allergies: Dog dander [dander] and Eggs or egg-derived products      Anesthesia Evaluation        Anesthetic Complications           GI/Hepatic/Renal                                 Cardiovascular        Exercise tolerance: good     MET: >4                                           Endo/Other      (+) diabetes  type 2,                   (+) arthritis       Pulmonary      (+) asthma              (+) sleep apnea       Neuro/Psych                                      Past Surgical History[3]  Social Hx on file[4]  History   Drug Use    Types: Cannabis     Available pre-op labs reviewed.  Lab Results   Component Value Date    WBC 16.0 (H) 06/01/2025    RBC 5.27 06/01/2025    HGB 16.6 06/01/2025    HCT 48.5 06/01/2025    MCV 92.0 06/01/2025    MCH 31.5 06/01/2025    MCHC 34.2 06/01/2025    RDW 13.2 06/01/2025    .0 06/01/2025     Lab Results   Component Value Date     06/01/2025    K 4.2 06/01/2025     06/01/2025    CO2 27.0 06/01/2025    BUN 18 06/01/2025    CREATSERUM 1.33 (H) 06/01/2025     (H) 06/01/2025    CA 9.4 06/01/2025            Airway      Mallampati: II  Mouth opening: 3 FB  TM distance: 4 - 6 cm  Neck ROM: full  Cardiovascular      Rhythm: regular  Rate: normal     Dental      Dental appliance(s): lower dentures and upper dentures       Pulmonary      Breath sounds clear to auscultation bilaterally.               Other findings              ASA: 2   Plan: general  NPO status verified and patient meets guidelines.    Post-procedure pain management plan discussed with surgeon and patient.    Comment: Options, risks and benefits of anesthesia as outlined in the anesthesia consent were reviewed with the patient. Risks and benefits of GA including sore throat, allergy, nausea, vomiting, dental trauma, pain management modalities were all discussed. Particularly the risk of dental trauma with weakened teeth or crowns, partials, fillings and any non natural teeth due to instrumentation of oral cavity and airway. Patient understands risks and verbally agreed to proceed. All questions answered.The consent was signed without further questions.        Plan/risks discussed with: patient  Use of blood product(s) discussed with: patient    Consented to blood products.          Present on Admission:  **None**             [1]    [COMPLETED] ceFAZolin (Ancef) 2g in 10mL IV syringe premix  2 g Intravenous On Call to OR    [COMPLETED] sodium chloride 0.9 % IV bolus 1,000 mL  1,000 mL Intravenous Once    [COMPLETED] ondansetron (Zofran) 4 MG/2ML injection 4 mg  4 mg Intravenous Once    [COMPLETED] ketorolac (Toradol) 15 MG/ML injection 15 mg  15 mg Intravenous Once    [COMPLETED] ondansetron (Zofran) 4 MG/2ML injection 4 mg  4 mg Intravenous Once    [COMPLETED] iopamidol 76% (ISOVUE-370) injection for power injector  100 mL Intravenous ONCE PRN    [COMPLETED] metoclopramide (Reglan) 5 mg/mL injection 5 mg  5 mg Intravenous Once    [COMPLETED] diphenhydrAMINE (Benadryl) 50 mg/mL  injection 25 mg  25 mg Intravenous Once    fluticasone-salmeterol (Advair Diskus) 500-50 MCG/ACT inhaler 1 puff  1 puff Inhalation 2 times per day    tamsulosin  (Flomax) cap 0.4 mg  0.4 mg Oral Nightly    glucose (Dex4) 15 GM/59ML oral liquid 15 g  15 g Oral Q15 Min PRN    Or    glucose (Glutose) 40% oral gel 15 g  15 g Oral Q15 Min PRN    Or    glucose-vitamin C (Dex-4) chewable tab 4 tablet  4 tablet Oral Q15 Min PRN    Or    dextrose 50% injection 50 mL  50 mL Intravenous Q15 Min PRN    Or    glucose (Dex4) 15 GM/59ML oral liquid 30 g  30 g Oral Q15 Min PRN    Or    glucose (Glutose) 40% oral gel 30 g  30 g Oral Q15 Min PRN    Or    glucose-vitamin C (Dex-4) chewable tab 8 tablet  8 tablet Oral Q15 Min PRN    hydrALAzine (Apresoline) 20 mg/mL injection 10 mg  10 mg Intravenous Q6H PRN    sodium chloride 0.9% infusion   Intravenous Continuous    acetaminophen (Tylenol) tab 650 mg  650 mg Oral Q4H PRN    Or    HYDROcodone-acetaminophen (Norco) 5-325 MG per tab 1 tablet  1 tablet Oral Q4H PRN    Or    HYDROcodone-acetaminophen (Norco) 5-325 MG per tab 2 tablet  2 tablet Oral Q4H PRN    morphINE PF 2 MG/ML injection 1 mg  1 mg Intravenous Q2H PRN    Or    morphINE PF 2 MG/ML injection 2 mg  2 mg Intravenous Q2H PRN    Or    morphINE PF 4 MG/ML injection 4 mg  4 mg Intravenous Q2H PRN    melatonin tab 3 mg  3 mg Oral Nightly PRN    ondansetron (Zofran) 4 MG/2ML injection 4 mg  4 mg Intravenous Q6H PRN    insulin aspart (NovoLOG) 100 Units/mL FlexPen 1-10 Units  1-10 Units Subcutaneous TID AC and HS    ketorolac (Toradol) 15 MG/ML injection 15 mg  15 mg Intravenous Q6H PRN    scopolamine (Transderm-Scop) 1 MG/3DAYS patch 1 patch  1 patch Transdermal Q72H    prochlorperazine (Compazine) 10 MG/2ML injection 10 mg  10 mg Intravenous Q6H PRN   [2]   Medications Prior to Admission   Medication Sig Dispense Refill Last Dose/Taking    lisinopril 2.5 MG Oral Tab Take 1 tablet (2.5 mg total) by mouth in the morning.   Past Week    metFORMIN HCl 1000 MG Oral Tab Take 1 tablet (1,000 mg total) by mouth in the morning and 1 tablet (1,000 mg total) before bedtime.   5/31/2025 Morning     tamsulosin 0.4 MG Oral Cap    5/30/2025 Bedtime    Montelukast Sodium (SINGULAIR) 10 MG Oral Tab TAKE 1 TABLET BY MOUTH NIGHTLY. 30 tablet 11 5/30/2025    fluticasone-salmeterol (ADVAIR DISKUS) 500-50 MCG/ACT Inhalation Aerosol Powder, Breath Activated Inhale 1 puff into the lungs Q12H.       Meloxicam 15 MG Oral Tab Take 1 tablet (15 mg total) by mouth in the morning.   5/29/2025    Azelastine HCl 0.05 % Ophthalmic Solution Place 1 drop into both eyes 2 (two) times daily. (Patient not taking: Reported on 10/12/2023) 1 Bottle 0     MONTELUKAST SODIUM 10 MG Oral Tab TAKE 1 TABLET BY MOUTH NIGHTLY. 30 tablet 10     DULERA 200-5 MCG/ACT Inhalation Aerosol INHALE 2 PUFFS INTO THE LUNGS 2 (TWO) TIMES DAILY. (Patient not taking: Reported on 10/12/2023) 1 Inhaler 11     PROAIR  (90 Base) MCG/ACT Inhalation Aero Soln INHALE 2 PUFFS INTO THE LUNGS EVERY 6 HOURS AS NEEDED FOR WHEEZING OR SHORTNESS OF BREATH 1 Inhaler 11 More than a month    Alpha-Lipoic Acid 100 MG Oral Cap Take  by mouth 2 (two) times daily. (Patient not taking: Reported on 10/12/2023)       Chromium Picolinate 200 MCG Oral Tab Take  by mouth 2 (two) times daily. (Patient not taking: Reported on 10/12/2023)       Garlic 500 MG Oral Cap Take  by mouth daily. (Patient not taking: Reported on 10/12/2023)       Taurine 500 MG Oral Cap Take  by mouth 2 (two) times daily. (Patient not taking: Reported on 10/12/2023)       Quercetin 250 MG Oral Tab Take  by mouth daily. (Patient not taking: Reported on 10/12/2023)       Misc Natural Products (ADRENAL) 200 MG Oral Cap Take  by mouth daily. (Patient not taking: Reported on 10/12/2023)       Amylase-Lipase-Protease (PANCREASE OR) Take  by mouth daily. (Patient not taking: Reported on 10/12/2023)       Thyroid, Porcine, Does not apply Powder by Does not apply route 2 (two) times daily. Thyroid glandular (Patient not taking: Reported on 10/12/2023)       VANADIUM OR Take 10 mg by mouth daily. (Patient not taking:  Reported on 10/12/2023)       B Complex Vitamins (VITAMIN B COMPLEX OR) Take 50 mg by mouth 2 (two) times daily. (Patient not taking: Reported on 10/12/2023)       Biotin 10 MG Oral Cap Take  by mouth 2 (two) times daily. (Patient not taking: Reported on 10/12/2023)       Coenzyme Q10 (Q-10 CO-ENZYME OR) Take 80 mg by mouth daily. (Patient not taking: Reported on 10/12/2023)       Magnesium 100 MG Oral Cap Take 750 mg by mouth daily. (Patient not taking: Reported on 10/12/2023)       Manganese 10 MG Oral Tab Take  by mouth daily. (Patient not taking: Reported on 10/12/2023)       Vitamin A, [id number], (VITAMIN A OR) Take  by mouth daily. (Patient not taking: Reported on 10/12/2023)       VITAMIN E OR Take  by mouth daily. (Patient not taking: Reported on 10/12/2023)       Ascorbic Acid (VITAMIN C OR) Take  by mouth daily. (Patient not taking: Reported on 10/12/2023)       PANTETHINE OR Take 100 mg by mouth daily. (Patient not taking: Reported on 10/12/2023)       Probiotic Product (PROBIOTIC DAILY OR) Take  by mouth 2 (two) times daily. (Patient not taking: Reported on 10/12/2023)       HYDROcodone-acetaminophen (NORCO) 5-325 MG Oral Tab Take 1-2 tablets by mouth every 4 to 6 hours as needed for Pain. (Patient not taking: Reported on 10/12/2023) 60 tablet 0     Ipratropium-Albuterol (COMBIVENT RESPIMAT)  MCG/ACT Inhalation Aero Soln Inhale 1 puff into the lungs every 6 (six) hours as needed. (Patient not taking: Reported on 10/12/2023) 1 Inhaler 6    [3]   Past Surgical History:  Procedure Laterality Date    Hernia surgery      Knee scope,med&lat menis shav Left 10/13/2014    Procedure: ARTHROSCOPY LEFT KNEE W/ MEDIAL MENISCECTOMY;  Surgeon: Saroj Cook MD;  Location: [facility]    Other surgical history      teeth removed   [4]   Social History  Socioeconomic History    Marital status:    Tobacco Use    Smoking status: Former     Current packs/day: 0.00     Average packs/day: 1 pack/day for  30.0 years (30.0 ttl pk-yrs)     Types: Cigarettes     Start date: 1/18/1980     Quit date: 1/18/2010     Years since quitting: 15.3    Smokeless tobacco: Never   Vaping Use    Vaping status: Every Day   Substance and Sexual Activity    Alcohol use: Not Currently     Comment: daily    Drug use: Yes     Types: Cannabis

## 2025-06-01 NOTE — PROGRESS NOTES
NURSING DISCHARGE NOTE    Discharged Home via Ambulatory.  Accompanied by Spouse  Belongings Taken by patient/family.    Patient declined wheelchair transport to vehicle. IV removed for discharge. Discharge education provided. All questions and concerns addressed.

## 2025-06-01 NOTE — PROGRESS NOTES
[facility]   part of [facility]     Hospitalist Progress Note     Jace Umana Patient Status:  Observation    3/16/1960 MRN [id number]   [facility] SURGERY Attending Michel Cohen MD   Hosp Day # 0 PCP DANYA CHANG     Chief Complaint: flank pain    Subjective:     Patient without acute events overnight. Just returned from cystosocpy. Seen in PACU    Objective:    Review of Systems:   A comprehensive review of systems was completed; pertinent positive and negatives stated in subjective.    Vital signs:  Temp:  [98.5 °F (36.9 °C)-99.3 °F (37.4 °C)] 98.9 °F (37.2 °C)  Pulse:  [54-93] 76  Resp:  [13-19] 18  BP: (140-180)/() 168/80  SpO2:  [93 %-100 %] 94 %    Physical Exam:    General: No acute distress  Respiratory: No wheezes, no rhonchi  Cardiovascular: S1, S2, regular rate and rhythm  Abdomen: Soft, Non-tender, non-distended, positive bowel sounds  Neuro: No new focal deficits.   Extremities: No edema      Diagnostic Data:    Labs:  Recent Labs   Lab 25  1143 25  0645   WBC 9.4 16.0*   HGB 17.3 16.6   MCV 92.2 92.0   .0 235.0       Recent Labs   Lab 25  1143 25  0645   * 186*   BUN 17 18   CREATSERUM 1.17 1.33*   CA 10.4 9.4   ALB 5.1*  --     141   K 4.6 4.2    103   CO2 26.0 27.0   ALKPHO 91  --    AST 26  --    ALT 37  --    BILT 0.7  --    TP 7.6  --        Estimated Glomerular Filtration Rate: 59 mL/min/1.73m2 (A) (result from lab).    No results for input(s): \"TROP\", \"TROPHS\", \"CK\" in the last 168 hours.    No results for input(s): \"PTP\", \"INR\" in the last 168 hours.               Microbiology    No results found for this visit on 25.      Imaging: Reviewed in Epic.    Medications:    [Transfer Hold] fluticasone-salmeterol  1 puff Inhalation 2 times per day    [Transfer Hold] tamsulosin  0.4 mg Oral Nightly    [Transfer Hold] insulin aspart  1-10 Units Subcutaneous TID AC and HS    [Transfer Hold] scopolamine  1  patch Transdermal Q72H       Assessment & Plan:      #Distal left ureter stone  -await Cx results  -strain urine  -s/p cystoscopy with stent placement  -IVF  -urology on consult     #DM type II  -hyperglycemia protocol  -hold metformin     #Aneurysmal dilatation of the right common iliac artery 19 mm  -follow up OP to monitor    #Ess HTN  PRN hydralzine      #BPH  -flomax     #Asthma  -resume inhalers      Michel Cohen MD    Supplementary Documentation:     Quality:  DVT Mechanical Prophylaxis:   SCDs,    DVT Pharmacologic Prophylaxis   Medication   None                Code Status: Not on file  Hoang: No urinary catheter in place  Hoang Duration (in days):   Central line:    MALINI: 6/1/2025    Discharge is dependent on: progress  At this point Mr. Umana is expected to be discharge to: home    The 21st Century Cures Act makes medical notes like these available to patients in the interest of transparency. Please be advised this is a medical document. Medical documents are intended to carry relevant information, facts as evident, and the clinical opinion of the practitioner. The medical note is intended as peer to peer communication and may appear blunt or direct. It is written in medical language and may contain abbreviations or verbiage that are unfamiliar.

## 2025-06-01 NOTE — CONSULTS
[facility]  Report of Consultation    Jace Umana Patient Status:  Observation    3/16/1960 MRN [id number]   Location [facility] 3NW-A Attending Michel Cohen MD   Hosp Day # 0 PCP DANYA CHANG     Reason for Consultation:  Ureter stone    History of Present Illness:  Jace Umana is a a(n) 65 year old male admitted for distal ureter stone  Presented with pain and nausea x 1 day. No fever. No hematuria. No dysuria  Overnight nausea persisted until almost 10pm per nurse  No known prior stone history but admits has had a pain like this before that always went away  Has known enlarged prostate  Takes FLomax which controls his LUTS. No prior concerns for prostate cancer  No UTI history, no hematuria history.  Pain is better this morning, but not resolved    History:  Past Medical History[1]  Past Surgical History[2]  Family History[3]   reports that he quit smoking about 15 years ago. His smoking use included cigarettes. He started smoking about 45 years ago. He has a 30 pack-year smoking history. He has never used smokeless tobacco. He reports that he does not currently use alcohol. He reports current drug use. Drug: Cannabis.    Allergies:  Allergies[4]    Medications:  Current Hospital Medications[5]    Review of Systems:  Pertinent items are noted in HPI.    Physical Exam:  BP (!) 168/80   Pulse 76   Temp 98.9 °F (37.2 °C) (Oral)   Resp 18   Ht 6' (1.829 m)   Wt 190 lb (86.2 kg)   SpO2 94%   BMI 25.77 kg/m²   ABD less sore on the left lower  No voiding pain    Laboratory Data:  Lab Results   Component Value Date    WBC 16.0 2025    HGB 16.6 2025    HCT 48.5 2025    .0 2025    CREATSERUM 1.33 2025    BUN 18 2025     2025    K 4.2 2025     2025    CO2 27.0 2025     2025    CA 9.4 2025    PGLU 184 2025     CBC:    Lab Results   Component Value Date    WBC 16.0 (H) 2025    WBC 9.4  05/31/2025    WBC 11.2 12/22/2012     Lab Results   Component Value Date    HGB 16.6 06/01/2025    HGB 17.3 05/31/2025    HGB 14.5 12/22/2012      Lab Results   Component Value Date    .0 06/01/2025    .0 05/31/2025    .0 12/22/2012       Kidney:    Lab Results   Component Value Date    BUN 18 06/01/2025    BUN 17 05/31/2025    BUN 24 (H) 12/22/2012     Lab Results   Component Value Date    CREATSERUM 1.33 (H) 06/01/2025    CREATSERUM 1.17 05/31/2025    CREATSERUM 0.9 12/22/2012     Lab Results   Component Value Date    COLORUR Light-Yellow 05/31/2025    CLARITY Clear 05/31/2025    SPECGRAVITY 1.015 05/31/2025    GLUUR 150 05/31/2025    BILUR Negative 05/31/2025    KETUR 20 05/31/2025    BLOODURINE Negative 05/31/2025    PHURINE 6.5 05/31/2025    PROUR Negative 05/31/2025    UROBILINOGEN Normal 05/31/2025    NITRITE Negative 05/31/2025    LEUUR Negative 05/31/2025       Imaging:  CT reviewed  CT ABDOMEN+PELVIS(CONTRAST ONLY)(CPT=74177)  Result Date: 5/31/2025  CONCLUSION:   There is a stone at the distal left ureter just proximal to the left UVJ measuring 3.5 mm.  There is mucosal enhancement and periureteral fat stranding along the left ureter.  Clinical correlation is recommended to assess for a superimposed infectious process/ascending UTI.  Marked enlargement of the prostate gland.  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  There is mild bladder wall thickening.  This may relate to bladder outlet obstruction.  Clinical correlation is recommended.   LOCATION:  [facility]   Dictated by (CST): Stromberg, LeRoy, MD on 5/31/2025 at 2:18 PM     Finalized by (CST): Stromberg, LeRoy, MD on 5/31/2025 at 2:26 PM           Impression:  Problem List[6]    Left ureter stone with hydronephrosis and DINORA, UA is benign. Pain is better but not resolved  DINORA is worse today although IV contrast was given  Leukocytosis noted on today's AM labs    Recommendations:  Discussed trial of  passage with pain medication and Flomax  Vs ureteroscopic stone extraction with post op ureter stent placement    He would like to proceed with surgery  Advised risks of BPH exacerbation including hematuria and retention    Surgical risks, benefits and alternatives discussed.  Risks of ureteroscopy including but not limited to infection, bleeding, perforation, possible need for stent, need for subsequent removal of stent, ureteral spasm, ureteral injury or stricture discussed with patient along with risks of anesthesia which could include death.       Patient will schedule cystoscopy, left ureteroscopy, stone extraction, possible stent placement and possible laser lithotripsy at the patient's earliest convenience today. He is NPO      Thank you for allowing me to participate in the care of your patient.    Jabier Singh MD  6/1/2025  9:57 AM            [1]   Past Medical History:   Diabetes (HCC)    \"Diet and excercise controlled,\" \"uses herbal supplements\"    Extrinsic asthma, unspecified    enviromentally/dog induced,uses inhaler \"daily.\"    Unspecified sleep apnea   [2]   Past Surgical History:  Procedure Laterality Date    Hernia surgery      Knee scope,med&lat menis shav Left 10/13/2014    Procedure: ARTHROSCOPY LEFT KNEE W/ MEDIAL MENISCECTOMY;  Surgeon: Saroj Cook MD;  Location: [facility]    Other surgical history      teeth removed   [3] No family history on file.  [4]   Allergies  Allergen Reactions    Dog Dander [Dander]     Eggs Or Egg-Derived Products      Eats egg and egg products, has just been told \"sensitive to.\"   [5]   Current Facility-Administered Medications:     fluticasone-salmeterol (Advair Diskus) 500-50 MCG/ACT inhaler 1 puff, 1 puff, Inhalation, 2 times per day    tamsulosin (Flomax) cap 0.4 mg, 0.4 mg, Oral, Nightly    glucose (Dex4) 15 GM/59ML oral liquid 15 g, 15 g, Oral, Q15 Min PRN **OR** glucose (Glutose) 40% oral gel 15 g, 15 g, Oral, Q15 Min PRN **OR** glucose-vitamin C  (Dex-4) chewable tab 4 tablet, 4 tablet, Oral, Q15 Min PRN **OR** dextrose 50% injection 50 mL, 50 mL, Intravenous, Q15 Min PRN **OR** glucose (Dex4) 15 GM/59ML oral liquid 30 g, 30 g, Oral, Q15 Min PRN **OR** glucose (Glutose) 40% oral gel 30 g, 30 g, Oral, Q15 Min PRN **OR** glucose-vitamin C (Dex-4) chewable tab 8 tablet, 8 tablet, Oral, Q15 Min PRN    hydrALAzine (Apresoline) 20 mg/mL injection 10 mg, 10 mg, Intravenous, Q6H PRN    sodium chloride 0.9% infusion, , Intravenous, Continuous    acetaminophen (Tylenol) tab 650 mg, 650 mg, Oral, Q4H PRN **OR** HYDROcodone-acetaminophen (Norco) 5-325 MG per tab 1 tablet, 1 tablet, Oral, Q4H PRN **OR** HYDROcodone-acetaminophen (Norco) 5-325 MG per tab 2 tablet, 2 tablet, Oral, Q4H PRN    morphINE PF 2 MG/ML injection 1 mg, 1 mg, Intravenous, Q2H PRN **OR** morphINE PF 2 MG/ML injection 2 mg, 2 mg, Intravenous, Q2H PRN **OR** morphINE PF 4 MG/ML injection 4 mg, 4 mg, Intravenous, Q2H PRN    melatonin tab 3 mg, 3 mg, Oral, Nightly PRN    ondansetron (Zofran) 4 MG/2ML injection 4 mg, 4 mg, Intravenous, Q6H PRN    insulin aspart (NovoLOG) 100 Units/mL FlexPen 1-10 Units, 1-10 Units, Subcutaneous, TID AC and HS    ketorolac (Toradol) 15 MG/ML injection 15 mg, 15 mg, Intravenous, Q6H PRN    scopolamine (Transderm-Scop) 1 MG/3DAYS patch 1 patch, 1 patch, Transdermal, Q72H    prochlorperazine (Compazine) 10 MG/2ML injection 10 mg, 10 mg, Intravenous, Q6H PRN  [6]   Patient Active Problem List  Diagnosis    SHERLYN (obstructive sleep apnea)    Asthma (HCC)    Effusion of left knee    Tear of medial cartilage or meniscus of knee, current    S/P left knee arthroscopy    Ureterolithiasis

## 2025-06-01 NOTE — PROGRESS NOTES
A&Ox4. VSS. RA. .  Telemetry: NSR  GI: Abdomen soft, nondistended. Passing gas.  Denies nausea, pain, SOB, and chest pain.   : Voids.  Medicated per MAR.   Up ad nancy.  Incisions: cysto string intact; no drainage noted  Diet: tolerating cardiac  IVF running per order.  All appropriate safety measures in place. All questions and concerns addressed. Plan of care ongoing.       1020: Patient transported JEFF for surgery

## 2025-06-01 NOTE — ANESTHESIA POSTPROCEDURE EVALUATION
[facility]    Jace Umana Patient Status:  Observation   Age/Gender 65 year old male MRN [id number]   Location [facility] POST ANESTHESIA CARE UNIT Attending Michel Cohen MD   Hosp Day # 0 PCP DANYA CHANG       Anesthesia Post-op Note    CYSTOSCOPY, RETROGRADE, PYELOGRAM, URETEROSCOPY, STONE MANIPULATION AND INSERTION OF URETERAL STENT- LEFT    Procedure Summary       Date: 06/01/25 Room / Location:  MAIN OR 07 /  MAIN OR    Anesthesia Start: 1037 Anesthesia Stop: 1146    Procedure: CYSTOSCOPY, RETROGRADE, PYELOGRAM, URETEROSCOPY, STONE MANIPULATION AND INSERTION OF URETERAL STENT- LEFT (Left: Ureter) Diagnosis:       Kidney stone on left side      (Kidney stone on left side [N20.0])    Surgeons: Jabier Singh MD Anesthesiologist: Dayo Rowe MD    Anesthesia Type: general ASA Status: 2            Anesthesia Type: general    Vitals Value Taken Time   BP 96/57 06/01/25 11:46   Temp 98.6 06/01/25 11:46   Pulse 86 06/01/25 11:46   Resp 18 06/01/25 11:46   SpO2 95 06/01/25 11:46           Patient Location: PACU    Anesthesia Type: general    Airway Patency: patent and extubated    Postop Pain Control: adequate    Nausea/Vomiting: none    Cardiopulmonary/Hydration status: stable euvolemic    Complications: no apparent anesthesia related complications    Postop vital signs: stable    Dental Exam: Unchanged from Preop    Patient to be discharged from PACU when criteria met.

## 2025-06-01 NOTE — OPERATIVE REPORT
[facility]   part of [facility]    Operative Note         Jace Umana Location: OR   [facility] [id number] MRN [id number]   Admission Date 5/31/2025 Operation Date 6/1/2025   Attending Physician Michel Cohen MD       Patient Name: Jace Umana     Preoperative Diagnosis: Ureter stone on left side [N20.0]     Postoperative Diagnosis: Ureter stone on left side [N20.0]     Procedure(s):  CYSTOSCOPY, RETROGRADE PYELOGRAM, URETEROSCOPY, STONE MANIPULATION AND INSERTION OF URETERAL STENT- LEFT     Primary Surgeon: Jabier Singh MD           Anesthesia: General     Specimen:   ID Type Source Tests Collected by Time Destination   A : left ureter stone Calculus Stone (calculus) CALCULI, URINARY Jabier Singh MD 6/1/2025 1110         Estimated Blood Loss: Blood Output: 5 mL (6/1/2025 11:33 AM)       Complications: none      Indications for procedure: intractable pain and nausea from distal left ureter stone     Surgical Findings: notable enlarged prostate, trabeculation of bladder, no cystitis, no tumors, no bladder stones.  Left UO cannulated for ureteroscopy, stone extraction, ureter stent placed with string for future removal after labs improve     Complexity: NA (optional)    Operative Summary:  .      DRAINS: 6Fr x 24cm double J stent with string     ESTIMATED BLOOD LOSS: Less than 5 mL.     INDICATION FOR PROCEDURE: Jace is a 65 year old year old year-old male who was diagnosed with left ureter stone that has not passed. Desires surgical intervention.  Understands the risks of the procedure, including but not limited to the general anesthetic involved, urinary bleeding, infection, bladder injury, ureteral injury, renal injury, inability to extract the large stone due to its large nature, the possible need for laser lithotripsy with subsequent passage of particles, the possible need for ureteral stent with subsequent removal, the risks for ureteral stricture disease, stone persistence, re-formation, and the  unilateral nature of this procedure. Would like to proceed.      PROCEDURE IN DETAIL: The patient was brought to the operating room after being correctly identified and surgical consent was obtained. All questions were answered. General anesthesia was induced without difficulty. Legs were placed in the dorsal lithotomy position, padded and secured, and the working area was prepped and draped in the usual sterile fashion. Examination by  fluoroscopy could not clearly identify the stone. A 21-Estonian scope was inserted per urethra. The urethra was normal. Very large prostate. Trilobar. Upon entry into the bladder, the urine was grossly clear. Panendoscopy was performed and showed no evidence of any bladder tumors, cystitis or stones. There is 3+ trabeculation. The ureteral orifices were in normal position behind the enlarged median lobe The left side was cannulated with the angled wire (straight wire initially would not pass). Then a second wire was able to be passed.  This allowed for some immediate ureteral urine drainage. Wires were confirmed to be passed up into the kidney. A semi rigid ureteroscope was then passed along side one wire and over the other. The stone was identified in this region of the distal ureter. I was able to use the stone basket to retrieve out the stone. Some small edges flecked off of the central portion of the stone.  Stone was sent for analysis. There was residual edema at this site from the stone. On retrograde pyelogram, I saw mild hydronephrosis.  The wire was positioned, a 6fr x 24cm double J ureter stent was deployed with the proximal pigtail in the kidney and the distal pigtail in the bladder. The string was left on the stent.  Lidocaine jelly was instilled per urethra. The patient was returned to the supine position, extubated, and transferred to the recovery room in stable condition. I was present and performed the entire procedure as stated above. There were no complications.   The stent will be removed in 3-4 days pending reevaluation of his blood work - CBC and BMP. Stone analysis is pending.          Implants:   Implant Name Type Inv. Item Serial No.  Lot No. LRB No. Used Action   STENT URET 6FR L24CM PERCFLX HYDR+ TAPR LG - SNA  STENT URET 6FR L24CM PERCFLX HYDR+ TAPR LG NA [employer] WD [id number] Left 1 Implanted        Drains: with string     Condition: stable       Jabier Singh MD

## 2025-06-01 NOTE — PROGRESS NOTES
A&Ox4. Vital signs are stable on room air.   Pt. Had persistent nausea, MD notified - see orders.   Voids in urinal, straining all urine.  Pain controlled with alternating medications per MAR.  Up with standby assist.  Diet: Regular, but not eating due to nausea.  IVF running per order.  Plan of care discussed with patient and wife at bedside. No further questions at this time. Call light in reach. Frequent rounding performed.

## 2025-06-01 NOTE — DISCHARGE INSTRUCTIONS
Your stone was removed  Your prostate is enlarged  Stay on your Tamsulosin (Flomax)  No Ibuprofen for the next few days  Blood work has been ordered for before the stent can be removed  Please get this on Monday or Tuesday  And then check with urology nursing if OK for stent removal as early as Wednesday once blood work is reviewed    Until then, here is information about a string stent:  You have a stent tied to a string. Please try not to pull on the string.  You will know if you accidentally pulled on the string if you become incontinent.  There will be some on and off blood in your urine and burning with urination until the stent has been removed  There can be pain that radiates up to your kidney when you urinate due to the stent.    Our [place] office is located at [address], [address]  Our largest [facility] lab is located at [address]. No appt is needed for blood work    Jabier Singh MD  [phone]

## 2025-06-10 ENCOUNTER — HOSPITAL ENCOUNTER (OUTPATIENT)
Facility: HOSPITAL | Age: 65
Setting detail: OBSERVATION
Discharge: HOME | End: 2025-06-11
Attending: EMERGENCY MEDICINE | Admitting: STUDENT IN AN ORGANIZED HEALTH CARE EDUCATION/TRAINING PROGRAM
Payer: COMMERCIAL

## 2025-06-10 DIAGNOSIS — R33.9 URINARY RETENTION: ICD-10-CM

## 2025-06-10 DIAGNOSIS — N17.9 ACUTE KIDNEY INJURY: Primary | ICD-10-CM

## 2025-06-10 PROBLEM — I10 PRIMARY HYPERTENSION: Status: ACTIVE | Noted: 2025-06-10

## 2025-06-10 PROBLEM — N13.8 BENIGN PROSTATIC HYPERPLASIA WITH URINARY OBSTRUCTION: Status: ACTIVE | Noted: 2025-06-10

## 2025-06-10 PROBLEM — R79.89 AZOTEMIA: Status: ACTIVE | Noted: 2025-06-10

## 2025-06-10 PROBLEM — N40.1 BENIGN PROSTATIC HYPERPLASIA WITH URINARY OBSTRUCTION: Status: ACTIVE | Noted: 2025-06-10

## 2025-06-10 PROBLEM — E11.9 TYPE 2 DIABETES MELLITUS WITHOUT COMPLICATION, WITHOUT LONG-TERM CURRENT USE OF INSULIN (HCC): Status: ACTIVE | Noted: 2025-06-10

## 2025-06-10 PROBLEM — R73.9 HYPERGLYCEMIA: Status: ACTIVE | Noted: 2025-06-10

## 2025-06-10 LAB
ALBUMIN SERPL-MCNC: 4.4 G/DL (ref 3.2–4.8)
ALBUMIN/GLOB SERPL: 1.5 {RATIO} (ref 1–2)
ALP LIVER SERPL-CCNC: 90 U/L (ref 45–117)
ALT SERPL-CCNC: 17 U/L (ref 10–49)
ANION GAP SERPL CALC-SCNC: 13 MMOL/L (ref 0–18)
AST SERPL-CCNC: 18 U/L (ref ?–34)
BASOPHILS # BLD AUTO: 0.05 X10(3) UL (ref 0–0.2)
BASOPHILS NFR BLD AUTO: 0.4 %
BILIRUB SERPL-MCNC: 0.5 MG/DL (ref 0.2–1.1)
BILIRUB UR QL STRIP.AUTO: NEGATIVE
BUN BLD-MCNC: 48 MG/DL (ref 9–23)
CALCIUM BLD-MCNC: 9.8 MG/DL (ref 8.7–10.6)
CAOX DIHYDRATE: 80 %
CAOX MONOHYDRATE: 20 %
CHLORIDE SERPL-SCNC: 103 MMOL/L (ref 98–112)
CLARITY UR REFRACT.AUTO: CLEAR
CO2 SERPL-SCNC: 24 MMOL/L (ref 21–32)
CREAT BLD-MCNC: 2.05 MG/DL (ref 0.7–1.3)
EGFRCR SERPLBLD CKD-EPI 2021: 35 ML/MIN/1.73M2 (ref 60–?)
EOSINOPHIL # BLD AUTO: 0.27 X10(3) UL (ref 0–0.7)
EOSINOPHIL NFR BLD AUTO: 2 %
ERYTHROCYTE [DISTWIDTH] IN BLOOD BY AUTOMATED COUNT: 12.4 %
GLOBULIN PLAS-MCNC: 3 G/DL (ref 2–3.5)
GLUCOSE BLD-MCNC: 193 MG/DL (ref 70–99)
GLUCOSE BLD-MCNC: 231 MG/DL (ref 70–99)
GLUCOSE UR STRIP.AUTO-MCNC: 1000 MG/DL
HCT VFR BLD AUTO: 42.7 % (ref 39–53)
HGB BLD-MCNC: 15.2 G/DL (ref 13–17.5)
IMM GRANULOCYTES # BLD AUTO: 0.27 X10(3) UL (ref 0–1)
IMM GRANULOCYTES NFR BLD: 2 %
KETONES UR STRIP.AUTO-MCNC: 20 MG/DL
LEUKOCYTE ESTERASE UR QL STRIP.AUTO: 75
LYMPHOCYTES # BLD AUTO: 1.8 X10(3) UL (ref 1–4)
LYMPHOCYTES NFR BLD AUTO: 13 %
MCH RBC QN AUTO: 31.3 PG (ref 26–34)
MCHC RBC AUTO-ENTMCNC: 35.6 G/DL (ref 31–37)
MCV RBC AUTO: 88 FL (ref 80–100)
MONOCYTES # BLD AUTO: 0.91 X10(3) UL (ref 0.1–1)
MONOCYTES NFR BLD AUTO: 6.6 %
NEUTROPHILS # BLD AUTO: 10.52 X10 (3) UL (ref 1.5–7.7)
NEUTROPHILS # BLD AUTO: 10.52 X10(3) UL (ref 1.5–7.7)
NEUTROPHILS NFR BLD AUTO: 76 %
NITRITE UR QL STRIP.AUTO: NEGATIVE
OSMOLALITY SERPL CALC.SUM OF ELEC: 310 MOSM/KG (ref 275–295)
PH UR STRIP.AUTO: 5.5 [PH] (ref 5–8)
PLATELET # BLD AUTO: 348 10(3)UL (ref 150–450)
POTASSIUM SERPL-SCNC: 4.3 MMOL/L (ref 3.5–5.1)
PROT SERPL-MCNC: 7.4 G/DL (ref 5.7–8.2)
RBC # BLD AUTO: 4.85 X10(6)UL (ref 3.8–5.8)
RBC #/AREA URNS AUTO: >10 /HPF
SODIUM SERPL-SCNC: 140 MMOL/L (ref 136–145)
SP GR UR STRIP.AUTO: 1.01 (ref 1–1.03)
UROBILINOGEN UR STRIP.AUTO-MCNC: NORMAL MG/DL
WBC # BLD AUTO: 13.8 X10(3) UL (ref 4–11)
WEIGHT-STONE: 7 MG

## 2025-06-10 PROCEDURE — 99223 1ST HOSP IP/OBS HIGH 75: CPT | Performed by: STUDENT IN AN ORGANIZED HEALTH CARE EDUCATION/TRAINING PROGRAM

## 2025-06-10 RX ORDER — MONTELUKAST SODIUM 10 MG/1
10 TABLET ORAL NIGHTLY
Status: DISCONTINUED | OUTPATIENT
Start: 2025-06-10 | End: 2025-06-11

## 2025-06-10 RX ORDER — HEPARIN SODIUM 5000 [USP'U]/ML
5000 INJECTION, SOLUTION INTRAVENOUS; SUBCUTANEOUS EVERY 8 HOURS SCHEDULED
Status: DISCONTINUED | OUTPATIENT
Start: 2025-06-10 | End: 2025-06-11

## 2025-06-10 RX ORDER — DEXTROSE MONOHYDRATE 25 G/50ML
50 INJECTION, SOLUTION INTRAVENOUS
Status: DISCONTINUED | OUTPATIENT
Start: 2025-06-10 | End: 2025-06-11

## 2025-06-10 RX ORDER — ONDANSETRON 2 MG/ML
4 INJECTION INTRAMUSCULAR; INTRAVENOUS EVERY 6 HOURS PRN
Status: DISCONTINUED | OUTPATIENT
Start: 2025-06-10 | End: 2025-06-11

## 2025-06-10 RX ORDER — POLYETHYLENE GLYCOL 3350 17 G/17G
17 POWDER, FOR SOLUTION ORAL DAILY PRN
Status: DISCONTINUED | OUTPATIENT
Start: 2025-06-10 | End: 2025-06-11

## 2025-06-10 RX ORDER — BISACODYL 10 MG
10 SUPPOSITORY, RECTAL RECTAL
Status: DISCONTINUED | OUTPATIENT
Start: 2025-06-10 | End: 2025-06-11

## 2025-06-10 RX ORDER — SODIUM CHLORIDE 9 MG/ML
INJECTION, SOLUTION INTRAVENOUS CONTINUOUS
OUTPATIENT
Start: 2025-06-10 | End: 2025-06-10

## 2025-06-10 RX ORDER — ACETAMINOPHEN 500 MG
1000 TABLET ORAL EVERY 8 HOURS PRN
Status: DISCONTINUED | OUTPATIENT
Start: 2025-06-10 | End: 2025-06-11

## 2025-06-10 RX ORDER — SENNOSIDES 8.6 MG
17.2 TABLET ORAL NIGHTLY PRN
Status: DISCONTINUED | OUTPATIENT
Start: 2025-06-10 | End: 2025-06-11

## 2025-06-10 RX ORDER — LISINOPRIL 2.5 MG/1
2.5 TABLET ORAL DAILY
Status: DISCONTINUED | OUTPATIENT
Start: 2025-06-10 | End: 2025-06-11

## 2025-06-10 RX ORDER — ECHINACEA PURPUREA EXTRACT 125 MG
1 TABLET ORAL
Status: DISCONTINUED | OUTPATIENT
Start: 2025-06-10 | End: 2025-06-11

## 2025-06-10 RX ORDER — NICOTINE POLACRILEX 4 MG
30 LOZENGE BUCCAL
Status: DISCONTINUED | OUTPATIENT
Start: 2025-06-10 | End: 2025-06-11

## 2025-06-10 RX ORDER — TAMSULOSIN HYDROCHLORIDE 0.4 MG/1
0.4 CAPSULE ORAL DAILY
Status: DISCONTINUED | OUTPATIENT
Start: 2025-06-10 | End: 2025-06-11

## 2025-06-10 RX ORDER — ONDANSETRON 2 MG/ML
4 INJECTION INTRAMUSCULAR; INTRAVENOUS EVERY 4 HOURS PRN
OUTPATIENT
Start: 2025-06-10 | End: 2025-06-10

## 2025-06-10 RX ORDER — NICOTINE POLACRILEX 4 MG
15 LOZENGE BUCCAL
Status: DISCONTINUED | OUTPATIENT
Start: 2025-06-10 | End: 2025-06-11

## 2025-06-10 RX ORDER — SODIUM CHLORIDE, SODIUM LACTATE, POTASSIUM CHLORIDE, CALCIUM CHLORIDE 600; 310; 30; 20 MG/100ML; MG/100ML; MG/100ML; MG/100ML
INJECTION, SOLUTION INTRAVENOUS CONTINUOUS
Status: DISCONTINUED | OUTPATIENT
Start: 2025-06-10 | End: 2025-06-11

## 2025-06-10 RX ORDER — METOCLOPRAMIDE HYDROCHLORIDE 5 MG/ML
10 INJECTION INTRAMUSCULAR; INTRAVENOUS EVERY 8 HOURS PRN
Status: DISCONTINUED | OUTPATIENT
Start: 2025-06-10 | End: 2025-06-11

## 2025-06-10 RX ORDER — FLUTICASONE PROPIONATE AND SALMETEROL 500; 50 UG/1; UG/1
1 POWDER RESPIRATORY (INHALATION) EVERY 12 HOURS
Status: DISCONTINUED | OUTPATIENT
Start: 2025-06-10 | End: 2025-06-11

## 2025-06-10 RX ORDER — HYDROMORPHONE HYDROCHLORIDE 1 MG/ML
0.5 INJECTION, SOLUTION INTRAMUSCULAR; INTRAVENOUS; SUBCUTANEOUS EVERY 30 MIN PRN
Refills: 0 | OUTPATIENT
Start: 2025-06-10 | End: 2025-06-10

## 2025-06-10 RX ORDER — BENZONATATE 100 MG/1
200 CAPSULE ORAL 3 TIMES DAILY PRN
Status: DISCONTINUED | OUTPATIENT
Start: 2025-06-10 | End: 2025-06-11

## 2025-06-10 NOTE — ED PROVIDER NOTES
Patient Seen in: [facility] Emergency Department        History  Chief Complaint   Patient presents with    Urinary Retention     Stated Complaint: + urinary retention sent from Urology office    Subjective:   HPI          65-year-old male who presents to the emergency department after having urinary retention.  Reviewing his records he was seen on 5/31/2025 for urolithiasis and a cystoscopy with retrograde pyelogram ureteroscope he instilled manipulation with insertion of ureteral stent.  Today on 6/10/2025 reviewing his records he was seen for an office visit due to obstruction.  He had a catheter placed and had 1600 cc of urine relieved from his bladder.  He was found to also have a elevation in his BUN and creatinine to sent to the hospital for continued evaluation.  The patient tells me he has been having severe nausea without ability to take oral fluids.  No fevers or chills.        Objective:     Past Medical History:    Diabetes (HCC)    \"Diet and excercise controlled,\" \"uses herbal supplements\"    Extrinsic asthma, unspecified    enviromentally/dog induced,uses inhaler \"daily.\"    Kidney stone    Primary hypertension    Unspecified sleep apnea              Past Surgical History:   Procedure Laterality Date    Cystoscopy,remv ureteral stone Left 06/01/2025    left URS, stone removal, stent    Hernia surgery      Knee scope,med&lat menis shav Left 10/13/2014    Procedure: ARTHROSCOPY LEFT KNEE W/ MEDIAL MENISCECTOMY;  Surgeon: Saroj Cook MD;  Location: [facility]    Other surgical history      teeth removed                Social History     Socioeconomic History    Marital status:    Tobacco Use    Smoking status: Former     Current packs/day: 0.00     Average packs/day: 1 pack/day for 30.0 years (30.0 ttl pk-yrs)     Types: Cigarettes     Start date: 1/18/1980     Quit date: 1/18/2010     Years since quitting: 15.4    Smokeless tobacco: Never   Vaping Use    Vaping status: Every Day    Substance and Sexual Activity    Alcohol use: Not Currently     Comment: daily    Drug use: Yes     Types: Cannabis     Social Drivers of Health     Food Insecurity: No Food Insecurity (6/10/2025)    NCSS - Food Insecurity     Worried About Running Out of Food in the Last Year: No     Ran Out of Food in the Last Year: No   Transportation Needs: No Transportation Needs (6/10/2025)    NCSS - Transportation     Lack of Transportation: No   Housing Stability: Not At Risk (6/10/2025)    NCSS - Housing/Utilities     Has Housing: Yes     Worried About Losing Housing: No     Unable to Get Utilities: No                                Physical Exam    ED Triage Vitals [06/10/25 1420]   BP (!) 154/99   Pulse 102   Resp 16   Temp 98.6 °F (37 °C)   Temp src Oral   SpO2 94 %   O2 Device None (Room air)       Current Vitals:   Vital Signs  BP: (!) 171/95  Pulse: 80  Resp: 17  Temp: 98.6 °F (37 °C)  Temp src: Oral  MAP (mmHg): (!) 113    Oxygen Therapy  SpO2: 97 %  O2 Device: None (Room air)            Physical Exam  General: This a pleasant nontoxic appearing patient in no apparent distress alert and oriented ×3  HEENT: Pupils are equal reactive to light.  Extra ocular motions are intact.  No scleral icterus or conjunctival pallor: Neck is supple without tenderness on palpation.  Head is atraumatic normocephalic.  Oral mucosa dry.  Tongue is midline.  No posterior pharyngeal lesions.    Lungs: Clear to auscultation bilaterally.  No wheezes, rhonchi, or rales appreciated.  No accessory muscle use noted for breathing.  Cardiac: Regular rate and rhythm.  Normal S1 and 2 without murmurs or ectopy appreciated  Abdomen: Soft on examination without tenderness to deep palpation or to percussion.  No masses appreciated.  Bowel sounds are normoactive.  No CVA tenderness.  Extremities: No cyanosis, no edema or clubbing.  Pulses are +2.  Full range of motion is noted of the extremities without deformities.  No tenderness.  Neurologically  intact.  Skin is dry.        ED Course  Labs Reviewed   CBC WITH DIFFERENTIAL WITH PLATELET - Abnormal; Notable for the following components:       Result Value    WBC 13.8 (*)     Neutrophil Absolute Prelim 10.52 (*)     Neutrophil Absolute 10.52 (*)     All other components within normal limits   COMP METABOLIC PANEL (14) - Abnormal; Notable for the following components:    Glucose 231 (*)     BUN 48 (*)     Creatinine 2.05 (*)     Calculated Osmolality 310 (*)     eGFR-Cr 35 (*)     All other components within normal limits   URINALYSIS WITH CULTURE REFLEX - Abnormal; Notable for the following components:    Glucose Urine 1000 (*)     Ketones Urine 20 (*)     Blood Urine 2+ (*)     Protein Urine Trace (*)     Leukocyte Esterase Urine 75 (*)     WBC Urine 11-20 (*)     RBC Urine >10 (*)     Squamous Epi. Cells Few (*)     All other components within normal limits   RAINBOW DRAW LAVENDER   RAINBOW DRAW LIGHT GREEN   RAINBOW DRAW BLUE   URINE CULTURE, ROUTINE                            MDM   The patient's urinalysis had 11-20 white blood cells proper field greater than 10 red blood cells but few squamous cells were noted no bacteria noted.  Negative nitrite.  Glucose of 231.  BUN of 48 creatinine of 2.05 with a GFR of 35.  White count 13,800.  The patient will be admitted to the hospital for IV fluids and continued monitoring of his BUN and creatinines to make sure his GFR is recovering after the obstruction.  I spoke to the hospital service.  He was admitted for further care.    Admission disposition: 6/10/2025  5:40 PM           Medical Decision Making      Disposition and Plan     Clinical Impression:  1. Acute kidney injury    2. Urinary retention         Disposition:  Admit  6/10/2025  5:40 pm    Follow-up:  No follow-up provider specified.        Medications Prescribed:  Current Discharge Medication List                Supplementary Documentation:         Hospital Problems       Present on Admission  Date  Reviewed: 4/28/2022          ICD-10-CM Noted POA    Acute kidney injury (HCC) N17.9 6/10/2025 Yes    Azotemia R79.89 6/10/2025 Yes    Benign prostatic hyperplasia with urinary obstruction N40.1, N13.8 6/10/2025 Unknown    Hyperglycemia R73.9 6/10/2025 Yes    Primary hypertension I10 6/10/2025 Unknown    Type 2 diabetes mellitus without complication, without long-term current use of insulin (HCC) E11.9 6/10/2025 Unknown    Urinary retention R33.9 6/10/2025 Unknown

## 2025-06-10 NOTE — ED INITIAL ASSESSMENT (HPI)
Patient to ED after urologist office drained approximately 1600 ml from bladder in office. Patient arrives to ED with catheter in place.

## 2025-06-10 NOTE — H&P
[facility]     Jace Umana Patient Status:  Emergency    3/16/1960 MRN [id number]   Location [facility] EMERGENCY DEPARTMENT Attending Ethan Mcnamara MD   Hosp Day # 0 PCP DANYA CHANG     Chief Complaint: Urinary retention    History of Present Illness: Jace Umana is a 65 year old male with PMHx DM2, Asthma, SHERLYN, who presents from urology clinical for post-op urinary retention.     Patient with recent admission From 2025 to 2025 for distal left ureteral stone status post stent placement, since discharge has been feeling generally unwell, having ongoing nausea, poor urinary output with significant leakage noted.  Went to see urology today and noted to have significant urinary retention, 1.6 L of urine removed after Hoang catheter placed.  Patient advised to follow-up with the ER for further workup.  He denies any fevers, chest pain, shortness of breath, abdominal pain, dysuria.      Past Medical History:Past Medical History[1]     Past Surgical History: Past Surgical History[2]    Social History:  reports that he quit smoking about 15 years ago. His smoking use included cigarettes. He started smoking about 45 years ago. He has a 30 pack-year smoking history. He has never used smokeless tobacco. He reports that he does not currently use alcohol. He reports current drug use. Drug: Cannabis.    Family History: Family History[3]    Allergies: Allergies[4]    Medications:  Medications Ordered Prior to Encounter[5]    Review of Systems:   A comprehensive 14 point review of systems was completed.    Pertinent positives and negatives noted in the HPI.    Physical Exam:    BP (!) 166/98   Pulse 101   Temp 98.6 °F (37 °C) (Oral)   Resp 16   Ht 5' 11\" (1.803 m)   Wt 190 lb (86.2 kg)   SpO2 96%   BMI 26.50 kg/m²   General: No acute distress. Alert and oriented x 3.  HEENT: Normocephalic atraumatic. Moist mucous membranes. EOM-I. PERRLA. Anicteric.  Neck: No  lymphadenopathy. No JVD. No carotid bruits.  Respiratory: Clear to auscultation bilaterally. No wheezes. No rhonchi.  Cardiovascular: S1, S2. Regular rate and rhythm. No murmurs, rubs or gallops. Equal pulses.   Chest and Back: No tenderness or deformity.  Abdomen: Soft, nontender, nondistended.  Positive bowel sounds. No rebound, guarding or organomegaly.  Neurologic: No focal neurological deficits. CNII-XII grossly intact.  Musculoskeletal: Moves all extremities.  Extremities: No edema or cyanosis.  Integument: No rashes or lesions.   Psychiatric: Appropriate mood and affect.    Diagnostic Data:      Labs:  Recent Labs   Lab 06/10/25  1428   WBC 13.8*   HGB 15.2   MCV 88.0   .0       Recent Labs   Lab 06/10/25  1428   *   BUN 48*   CREATSERUM 2.05*   CA 9.8   ALB 4.4      K 4.3      CO2 24.0   ALKPHO 90   AST 18   ALT 17   BILT 0.5   TP 7.4       Estimated Creatinine Clearance: 38.3 mL/min (A) (based on SCr of 2.05 mg/dL (H)).    No results for input(s): \"PTP\", \"INR\" in the last 168 hours.    No results for input(s): \"TROP\", \"CK\" in the last 168 hours.    Imaging: Imaging data reviewed in Epic.  No results found.      ASSESSMENT / PLAN:     # DINORA 2/2 urinary obstruction   - IVF, continue olivares cath   - trend chem    - UA at urology clinic without signs of infection noted; repeat pending    - urology on consult    - flomax     # Urolithiasis s/p stent placement    - continue olivares cath   - flomax    - urology consulted     # DM2 - LDSSI; Last A1c value was 6.5% done 5/31/2025.  # Asthma - continue home nebs  # Obstructive Sleep Apnea - CPAP at night per protocol    # Hypertension - hold home ACE-I       Code Status: Not on file    Plan of care discussed with patient, ED physician    Jose Ferreira MD  6/10/2025    Supplementary Documentation:      MDM : Patient's ER labs, imaging reviewed.  AM labs ordered.  ER management discussed with ED physician, decision made for patient to be  admitted to the hospital for further medical management.                  [1]   Past Medical History:   Diabetes (HCC)    \"Diet and excercise controlled,\" \"uses herbal supplements\"    Extrinsic asthma, unspecified    enviromentally/dog induced,uses inhaler \"daily.\"    Kidney stone    Unspecified sleep apnea   [2]   Past Surgical History:  Procedure Laterality Date    Cystoscopy,remv ureteral stone Left 06/01/2025    left URS, stone removal, stent    Hernia surgery      Knee scope,med&lat menis shav Left 10/13/2014    Procedure: ARTHROSCOPY LEFT KNEE W/ MEDIAL MENISCECTOMY;  Surgeon: Saroj Cook MD;  Location: [facility]    Other surgical history      teeth removed   [3] No family history on file.  [4]   Allergies  Allergen Reactions    Dog Dander [Dander]     Eggs Or Egg-Derived Products      Eats egg and egg products, has just been told \"sensitive to.\"   [5]   Current Facility-Administered Medications on File Prior to Encounter   Medication Dose Route Frequency Provider Last Rate Last Admin    [COMPLETED] ceFAZolin (Ancef) 2g in 10mL IV syringe premix  2 g Intravenous On Call to OR Jabier Singh MD   Stopped at 06/01/25 1200    [COMPLETED] sodium chloride 0.9 % IV bolus 1,000 mL  1,000 mL Intravenous Once Rony Novak MD   Stopped at 05/31/25 1227    [COMPLETED] ondansetron (Zofran) 4 MG/2ML injection 4 mg  4 mg Intravenous Once Rony Novak MD   4 mg at 05/31/25 1140    [COMPLETED] ketorolac (Toradol) 15 MG/ML injection 15 mg  15 mg Intravenous Once Rony Novak MD   15 mg at 05/31/25 1439    [COMPLETED] ondansetron (Zofran) 4 MG/2ML injection 4 mg  4 mg Intravenous Once Rony Novak MD   4 mg at 05/31/25 1227    [COMPLETED] iopamidol 76% (ISOVUE-370) injection for power injector  100 mL Intravenous ONCE PRN Rony Novak MD   100 mL at 05/31/25 1310    [COMPLETED] metoclopramide (Reglan) 5 mg/mL injection 5 mg  5 mg Intravenous Once  Rony Novak MD   5 mg at 05/31/25 1331    [COMPLETED] diphenhydrAMINE (Benadryl) 50 mg/mL  injection 25 mg  25 mg Intravenous Once Rony Novak MD   25 mg at 05/31/25 1431     Current Outpatient Medications on File Prior to Encounter   Medication Sig Dispense Refill    HYDROcodone-acetaminophen 5-325 MG Oral Tab Take 1 tablet by mouth every 6 (six) hours as needed. 6 tablet 0    fluticasone-salmeterol (ADVAIR DISKUS) 500-50 MCG/ACT Inhalation Aerosol Powder, Breath Activated Inhale 1 puff into the lungs Q12H.      lisinopril 2.5 MG Oral Tab Take 1 tablet (2.5 mg total) by mouth in the morning.      Meloxicam 15 MG Oral Tab Take 1 tablet (15 mg total) by mouth in the morning.      metFORMIN HCl 1000 MG Oral Tab Take 1 tablet (1,000 mg total) by mouth in the morning and 1 tablet (1,000 mg total) before bedtime.      tamsulosin 0.4 MG Oral Cap       MONTELUKAST SODIUM 10 MG Oral Tab TAKE 1 TABLET BY MOUTH NIGHTLY. 30 tablet 10    DULERA 200-5 MCG/ACT Inhalation Aerosol INHALE 2 PUFFS INTO THE LUNGS 2 (TWO) TIMES DAILY. (Patient not taking: Reported on 10/12/2023) 1 Inhaler 11    PROAIR  (90 Base) MCG/ACT Inhalation Aero Soln INHALE 2 PUFFS INTO THE LUNGS EVERY 6 HOURS AS NEEDED FOR WHEEZING OR SHORTNESS OF BREATH 1 Inhaler 11    Montelukast Sodium (SINGULAIR) 10 MG Oral Tab TAKE 1 TABLET BY MOUTH NIGHTLY. 30 tablet 11    Ipratropium-Albuterol (COMBIVENT RESPIMAT)  MCG/ACT Inhalation Aero Soln Inhale 1 puff into the lungs every 6 (six) hours as needed. (Patient not taking: Reported on 10/12/2023) 1 Inhaler 6

## 2025-06-10 NOTE — ED QUICK NOTES
Orders for admission, patient is aware of plan and ready to go upstairs. Any questions, please call ED RN abdirahman at extension [phone]    Patient Covid vaccination status: Fully vaccinated     COVID Test Ordered in ED: None    COVID Suspicion at Admission: N/A    Running Infusions: Medication Infusions[1]     Mental Status/LOC at time of transport: oriented x4    Other pertinent information: na  CIWA score: N/A   NIH score:  N/A             [1]

## 2025-06-11 VITALS
SYSTOLIC BLOOD PRESSURE: 148 MMHG | WEIGHT: 179.19 LBS | TEMPERATURE: 99 F | DIASTOLIC BLOOD PRESSURE: 83 MMHG | RESPIRATION RATE: 18 BRPM | HEART RATE: 92 BPM | HEIGHT: 71 IN | BODY MASS INDEX: 25.09 KG/M2 | OXYGEN SATURATION: 93 %

## 2025-06-11 LAB
ALBUMIN SERPL-MCNC: 3.7 G/DL (ref 3.2–4.8)
ALBUMIN/GLOB SERPL: 1.5 {RATIO} (ref 1–2)
ALP LIVER SERPL-CCNC: 75 U/L (ref 45–117)
ALT SERPL-CCNC: 13 U/L (ref 10–49)
ANION GAP SERPL CALC-SCNC: 7 MMOL/L (ref 0–18)
AST SERPL-CCNC: 19 U/L (ref ?–34)
BASOPHILS # BLD AUTO: 0.08 X10(3) UL (ref 0–0.2)
BASOPHILS NFR BLD AUTO: 0.6 %
BILIRUB SERPL-MCNC: 0.6 MG/DL (ref 0.2–1.1)
BUN BLD-MCNC: 21 MG/DL (ref 9–23)
CALCIUM BLD-MCNC: 8.8 MG/DL (ref 8.7–10.6)
CHLORIDE SERPL-SCNC: 105 MMOL/L (ref 98–112)
CO2 SERPL-SCNC: 30 MMOL/L (ref 21–32)
CREAT BLD-MCNC: 0.85 MG/DL (ref 0.7–1.3)
EGFRCR SERPLBLD CKD-EPI 2021: 96 ML/MIN/1.73M2 (ref 60–?)
EOSINOPHIL # BLD AUTO: 0.62 X10(3) UL (ref 0–0.7)
EOSINOPHIL NFR BLD AUTO: 5 %
ERYTHROCYTE [DISTWIDTH] IN BLOOD BY AUTOMATED COUNT: 12.6 %
GLOBULIN PLAS-MCNC: 2.4 G/DL (ref 2–3.5)
GLUCOSE BLD-MCNC: 193 MG/DL (ref 70–99)
GLUCOSE BLD-MCNC: 202 MG/DL (ref 70–99)
GLUCOSE BLD-MCNC: 210 MG/DL (ref 70–99)
HCT VFR BLD AUTO: 38.3 % (ref 39–53)
HGB BLD-MCNC: 13.3 G/DL (ref 13–17.5)
IMM GRANULOCYTES # BLD AUTO: 0.27 X10(3) UL (ref 0–1)
IMM GRANULOCYTES NFR BLD: 2.2 %
INR BLD: 1.28 (ref 0.8–1.2)
LYMPHOCYTES # BLD AUTO: 2.03 X10(3) UL (ref 1–4)
LYMPHOCYTES NFR BLD AUTO: 16.4 %
MAGNESIUM SERPL-MCNC: 1.8 MG/DL (ref 1.6–2.6)
MCH RBC QN AUTO: 31.4 PG (ref 26–34)
MCHC RBC AUTO-ENTMCNC: 34.7 G/DL (ref 31–37)
MCV RBC AUTO: 90.3 FL (ref 80–100)
MONOCYTES # BLD AUTO: 0.83 X10(3) UL (ref 0.1–1)
MONOCYTES NFR BLD AUTO: 6.7 %
NEUTROPHILS # BLD AUTO: 8.53 X10 (3) UL (ref 1.5–7.7)
NEUTROPHILS # BLD AUTO: 8.53 X10(3) UL (ref 1.5–7.7)
NEUTROPHILS NFR BLD AUTO: 69.1 %
OSMOLALITY SERPL CALC.SUM OF ELEC: 303 MOSM/KG (ref 275–295)
PHOSPHATE SERPL-MCNC: 1.9 MG/DL (ref 2.4–5.1)
PLATELET # BLD AUTO: 340 10(3)UL (ref 150–450)
POTASSIUM SERPL-SCNC: 4.1 MMOL/L (ref 3.5–5.1)
PROT SERPL-MCNC: 6.1 G/DL (ref 5.7–8.2)
PROTHROMBIN TIME: 16.1 SECONDS (ref 11.6–14.8)
RBC # BLD AUTO: 4.24 X10(6)UL (ref 3.8–5.8)
SODIUM SERPL-SCNC: 142 MMOL/L (ref 136–145)
WBC # BLD AUTO: 12.4 X10(3) UL (ref 4–11)

## 2025-06-11 PROCEDURE — 99239 HOSP IP/OBS DSCHRG MGMT >30: CPT | Performed by: HOSPITALIST

## 2025-06-11 RX ORDER — MAGNESIUM OXIDE 400 MG/1
400 TABLET ORAL ONCE
Status: COMPLETED | OUTPATIENT
Start: 2025-06-11 | End: 2025-06-11

## 2025-06-11 RX ORDER — TAMSULOSIN HYDROCHLORIDE 0.4 MG/1
0.8 CAPSULE ORAL DAILY
Qty: 180 CAPSULE | Refills: 1 | Status: SHIPPED | OUTPATIENT
Start: 2025-06-12

## 2025-06-11 RX ORDER — TAMSULOSIN HYDROCHLORIDE 0.4 MG/1
0.8 CAPSULE ORAL DAILY
Status: DISCONTINUED | OUTPATIENT
Start: 2025-06-11 | End: 2025-06-11

## 2025-06-11 RX ORDER — FINASTERIDE 5 MG/1
5 TABLET, FILM COATED ORAL DAILY
Status: DISCONTINUED | OUTPATIENT
Start: 2025-06-11 | End: 2025-06-11

## 2025-06-11 RX ORDER — MAGNESIUM CARB/ALUMINUM HYDROX 105-160MG
296 TABLET,CHEWABLE ORAL ONCE
Status: COMPLETED | OUTPATIENT
Start: 2025-06-11 | End: 2025-06-11

## 2025-06-11 RX ORDER — FINASTERIDE 5 MG/1
5 TABLET, FILM COATED ORAL DAILY
Qty: 90 TABLET | Refills: 1 | Status: SHIPPED | OUTPATIENT
Start: 2025-06-12

## 2025-06-11 NOTE — DISCHARGE SUMMARY
[facility]     Jace Umana Patient Status:  Observation    3/16/1960 MRN [id number]   Location [facility] 3NE-A Attending Mack Isbell MD   Hosp Day # 0 PCP DANYA CHANG     Date of Admission: 6/10/2025  Date of Discharge:   2025    Discharge Disposition: Home or Self Care    Discharge Diagnosis:  Acute kidney injury secondary to urinary obstruction/retention requiring placement of Hoang  Urolithiasis status post stent placement  Diabetes mellitus type 2  Asthma  Obstructive sleep apnea CPAP at night  Essential hypertension  Constipation resolved    History of Present Illness:   Jace Umana is a 65 year old male with PMHx DM2, Asthma, SHERLYN, who presents from urology clinical for post-op urinary retention.      Patient with recent admission From 2025 to 2025 for distal left ureteral stone status post stent placement, since discharge has been feeling generally unwell, having ongoing nausea, poor urinary output with significant leakage noted.  Went to see urology today and noted to have significant urinary retention, 1.6 L of urine removed after Hoang catheter placed.  Patient advised to follow-up with the ER for further workup.  He denies any fevers, chest pain, shortness of breath, abdominal pain, dysuria.    Brief Synopsis:    65-year-old had undergone recent urology procedure with stent placement.  Patient was seen in urology office not feeling well for several days poor p.o. intake.  Patient was found to have 1.6 L of urine retained in bladder when Hoang was placed also found to have acute kidney injury was sent to the ER urine culture was sent.  IV fluids were given.  Patient admitted.  Patient's labs have improved renal function is normalized.  Patient had elevated white count is coming down is afebrile urine culture is pending at time of discharge.  Patient's BPH meds were adjusted per urology.  Will go home with Hoang will need voiding trial in 2 weeks  may have to learn [facility] will need follow-up with PCP and urology.  Check BMP 6/13 to ensure that kidneys have continued to recover.  Patient had bowel movement is feeling much better today    Lace+ Score: 62  59-90 High Risk  29-58 Medium Risk  0-28   Low Risk       TCM Follow-Up Recommendation:  LACE 29-58: Moderate Risk of readmission after discharge from the hospital.      Procedures during hospitalization:   None    Incidental or significant findings and recommendations (brief descriptions):  Home with Hoang catheter will need voiding trial in 2 weeks with urology  Tamsulson increased to 0.8 mg  Proscar added 5 mg daily  May need to learn [facility] if voiding trial fails  May need prostate procedure pending patient clinical course per urology  Renal function improved after hydration and release of obstruction creatinine improved to 0.85 down from 2.05 at admission  WBC lower at 12.4 afebrile  No antibiotics started urine culture pending  BMP Friday 6/13  Needs to maintain hydration  Maintain bowel movements had bowel movement 6/11    Lab/Test results pending at Discharge:   Urine culture    Consultants:  Urology    Discharge Medication List:     Discharge Medications        START taking these medications        Instructions Prescription details   finasteride 5 MG Tabs  Commonly known as: Proscar  Start taking on: June 12, 2025      Take 1 tablet (5 mg total) by mouth daily.   Quantity: 90 tablet  Refills: 1            CHANGE how you take these medications        Instructions Prescription details   tamsulosin 0.4 MG Caps  Commonly known as: Flomax  Start taking on: June 12, 2025  What changed: how much to take      Take 2 capsules (0.8 mg total) by mouth daily.   Quantity: 180 capsule  Refills: 1            CONTINUE taking these medications        Instructions Prescription details   Advair Diskus 500-50 MCG/ACT Aepb  Generic drug: fluticasone-salmeterol      Inhale 1 puff into the lungs in the morning and 1 puff in the  evening.   Refills: 0     HYDROcodone-acetaminophen 5-325 MG Tabs  Commonly known as: Norco      Take 1 tablet by mouth every 6 (six) hours as needed.   Quantity: 6 tablet  Refills: 0     lisinopril 2.5 MG Tabs  Commonly known as: Prinivil; Zestril      Take 1 tablet (2.5 mg total) by mouth in the morning.   Refills: 0     Meloxicam 15 MG Tabs      Take 1 tablet (15 mg total) by mouth in the morning.   Refills: 0     metFORMIN HCl 1000 MG Tabs  Commonly known as: GLUCOPHAGE      Take 0.75 tablets (750 mg total) by mouth in the morning and 0.75 tablets (750 mg total) before bedtime.   Refills: 0     montelukast 10 MG Tabs  Commonly known as: Singulair      TAKE 1 TABLET BY MOUTH NIGHTLY.   Quantity: 30 tablet  Refills: 11     ProAir  (90 Base) MCG/ACT Aers  Generic drug: albuterol      INHALE 2 PUFFS INTO THE LUNGS EVERY 6 HOURS AS NEEDED FOR WHEEZING OR SHORTNESS OF BREATH   Quantity: 1 Inhaler  Refills: 11            STOP taking these medications      Dulera 200-5 MCG/ACT Aero  Generic drug: Mometasone Furo-Formoterol Fum        ipratropium-albuterol  MCG/ACT Aers  Commonly known as: Combivent Respimat                  Where to Get Your Medications        These medications were sent to [facility] - [place], [place] -  [address] [phone], [phone]  [address], [facility] [address]      Phone: [phone]   finasteride 5 MG Tabs  tamsulosin 0.4 MG Caps         [place]PMP reviewed: Reviewed    Follow-up appointment:   Jennie Champagne PA  [address]  [address]  [facility] [address] [phone]    Schedule an appointment as soon as possible for a visit in 2 week(s)  urology follow-up for voiding trial, urinary retention, BPH    Kalpesh Graham  [address]  [address]  [facility] [address] [phone]    Follow up      Appointments for Next 30 Days 6/11/2025 - 7/11/2025      None            Vital signs:  Temp:  [98.5 °F (36.9 °C)-99.6 °F (37.6 °C)] 98.6 °F (37 °C)  Pulse:   [] 92  Resp:  [16-18] 18  BP: (148-178)/() 148/83  SpO2:  [92 %-97 %] 93 %    Physical Exam:    General: No acute distress awake alert  Lungs: clear to auscultation  Cardiovascular: S1, S2 unlabored  Abdomen: Soft nontender positive bowel sounds  Had BM  Edema reduced  Hoang clear yellow urine    -----------------------------------------------------------------------------------------------  PATIENT DISCHARGE INSTRUCTIONS: See electronic chart    Pia Pat NP    Total time spent on discharge planning:  x minutes     The 21st Century Cures Act makes medical notes like these available to patients in the interest of transparency. Please be advised this is a medical document. Medical documents are intended to carry relevant information, facts as evident, and the clinical opinion of the practitioner. The medical note is intended as peer to peer communication and may appear blunt or direct. It is written in medical language and may contain abbreviations or verbiage that are unfamiliar.

## 2025-06-11 NOTE — PROGRESS NOTES
[facility]   part of [facility]     Hospitalist Progress Note     Jace Umana Patient Status:  Observation    3/16/1960 MRN [id number]   Location [facility] 3NE-A Attending Mack Isbell MD   Hosp Day # 0 PCP DANYA CHANG     Chief Complaint: .urinary retention/ DINORA     Subjective:     Patient   feels much better  Had BM   Sl dizzy  when up but better      Objective:    Review of Systems:   A comprehensive review of systems was completed; pertinent positive and negatives stated in subjective.    Vital signs:  Temp:  [98.5 °F (36.9 °C)-99.6 °F (37.6 °C)] 98.6 °F (37 °C)  Pulse:  [] 92  Resp:  [16-18] 18  BP: (148-178)/() 148/83  SpO2:  [92 %-97 %] 93 %    Physical Exam:    General: No acute distress  AO   Respiratory: No wheezes, no rhonchi  clear   Cardiovascular: S1, S2, regular rate and rhythm ST   Abdomen: Soft, Non-tender, non-distended, positive bowel sounds  had BM   Neuro: No new focal deficits.   Extremities: No edema  Olivares  clear urine       Diagnostic Data:    Labs:  Recent Labs   Lab 06/10/25  1428 25  0631   WBC 13.8* 12.4*   HGB 15.2 13.3   MCV 88.0 90.3   .0 340.0   INR  --  1.28*       Recent Labs   Lab 06/10/25  1428 25  0631   * 202*   BUN 48* 21   CREATSERUM 2.05* 0.85   CA 9.8 8.8   ALB 4.4 3.7    142   K 4.3 4.1    105   CO2 24.0 30.0   ALKPHO 90 75   AST 18 19   ALT 17 13   BILT 0.5 0.6   TP 7.4 6.1       Estimated Glomerular Filtration Rate: 96 mL/min/1.73m2 (result from lab).    No results for input(s): \"TROP\", \"TROPHS\", \"CK\" in the last 168 hours.    Recent Labs   Lab 25  0631   PTP 16.1*   INR 1.28*                  Microbiology    No results found for this visit on 06/10/25.      Imaging: Reviewed in Epic.    Medications: Scheduled Medications[1]    Assessment & Plan:      # DINORA 2/2 urinary obstruction   - IVF, continue olivares cath  will need voiding trial 2 jass w/     -  - UA at urology clinic without signs of  infection noted; repeat pending    - urology on consult    - flomax    dose increase proscar added   - needs cath education      # Urolithiasis s/p stent placement    - continue olivares cath   - flomax   proscar    - urology consulted   Dc today      # DM2 - LDSSI; Last A1c value was 6.5% done 5/31/2025.  # Asthma - continue home nebs  # Obstructive Sleep Apnea - CPAP at night per protocol    # Hypertension - hold home ACE-I       Pia Pat NP    Supplementary Documentation:     Quality:  DVT Mechanical Prophylaxis:   SCDs, Early ambuation  DVT Pharmacologic Prophylaxis   Medication    heparin (Porcine) 5000 UNIT/ML injection 5,000 Units                Code Status: Not on file  Olivares: Olivares catheter in place  Olivares Duration (in days): 2  Central line:    MALINI:     Discharge is dependent on: clinical course  At this point Mr. Umana is expected to be discharge to: home     The 21st Century Cures Act makes medical notes like these available to patients in the interest of transparency. Please be advised this is a medical document. Medical documents are intended to carry relevant information, facts as evident, and the clinical opinion of the practitioner. The medical note is intended as peer to peer communication and may appear blunt or direct. It is written in medical language and may contain abbreviations or verbiage that are unfamiliar.                         [1]    tamsulosin  0.8 mg Oral Daily    finasteride  5 mg Oral Daily    fluticasone-salmeterol  1 puff Inhalation 2 times per day    [Held by provider] lisinopril  2.5 mg Oral Daily    montelukast  10 mg Oral Nightly    insulin aspart  1-10 Units Subcutaneous TID AC and HS    heparin  5,000 Units Subcutaneous Q8H COLEMAN

## 2025-06-11 NOTE — PLAN OF CARE
Received from ED. Admitted to room 3609, at 1920. VSS.  Admission Navigator completed by Admit nurse. Wife at bedside.  Admission orders received.  A/O x 4.  RA  Tele - NSR.  DVT prophylaxis: heparin.  Left FA piv: Lactated Ringer's @ 100 ml/hr.  Electrolyte protocol: non-cardiac.  Carb-controlled, renal diet.  Accucheck: QID.  Hoang, continent of BM.  Ambulates ad nancy.  All safety precautions in place.  Call light within reach.  Plan: Urology consult. Continue with current POC.    Problem: PAIN - ADULT  Goal: Verbalizes/displays adequate comfort level or patient's stated pain goal  Description: INTERVENTIONS:  - Encourage pt to monitor pain and request assistance  - Assess pain using appropriate pain scale  - Administer analgesics based on type and severity of pain and evaluate response  - Implement non-pharmacological measures as appropriate and evaluate response  - Consider cultural and social influences on pain and pain management  - Manage/alleviate anxiety  - Utilize distraction and/or relaxation techniques  - Monitor for opioid side effects  - Notify MD/LIP if interventions unsuccessful or patient reports new pain  - Anticipate increased pain with activity and pre-medicate as appropriate  Outcome: Progressing     Problem: RISK FOR INFECTION - ADULT  Goal: Absence of fever/infection during anticipated neutropenic period  Description: INTERVENTIONS  - Monitor WBC  - Administer growth factors as ordered  - Implement neutropenic guidelines  Outcome: Progressing     Problem: GENITOURINARY - ADULT  Goal: Absence of urinary retention  Description: INTERVENTIONS:  - Assess patient’s ability to void and empty bladder  - Monitor intake/output and perform bladder scan as needed  - Follow urinary retention protocol/standard of care  - Consider collaborating with pharmacy to review patient's medication profile  - Implement strategies to promote bladder emptying  Outcome: Progressing     Problem: METABOLIC/FLUID AND  ELECTROLYTES - ADULT  Goal: Glucose maintained within prescribed range  Description: INTERVENTIONS:  - Monitor Blood Glucose as ordered  - Assess for signs and symptoms of hyperglycemia and hypoglycemia  - Administer ordered medications to maintain glucose within target range  - Assess barriers to adequate nutritional intake and initiate nutrition consult as needed  - Instruct patient on self management of diabetes  Outcome: Progressing  Goal: Electrolytes maintained within normal limits  Description: INTERVENTIONS:  - Monitor labs and rhythm and assess patient for signs and symptoms of electrolyte imbalances  - Administer electrolyte replacement as ordered  - Monitor response to electrolyte replacements, including rhythm and repeat lab results as appropriate  - Fluid restriction as ordered  - Instruct patient on fluid and nutrition restrictions as appropriate  Outcome: Progressing  Goal: Hemodynamic stability and optimal renal function maintained  Description: INTERVENTIONS:  - Monitor labs and assess for signs and symptoms of volume excess or deficit  - Monitor intake, output and patient weight  - Monitor urine specific gravity, serum osmolarity and serum sodium as indicated or ordered  - Monitor response to interventions for patient's volume status, including labs, urine output, blood pressure (other measures as available)  - Encourage oral intake as appropriate  - Instruct patient on fluid and nutrition restrictions as appropriate  Outcome: Progressing     Problem: RESPIRATORY - ADULT  Goal: Achieves optimal ventilation and oxygenation  Description: INTERVENTIONS:  - Assess for changes in respiratory status  - Assess for changes in mentation and behavior  - Position to facilitate oxygenation and minimize respiratory effort  - Oxygen supplementation based on oxygen saturation or ABGs  - Provide Smoking Cessation handout, if applicable  - Encourage broncho-pulmonary hygiene including cough, deep breathe, Incentive  Spirometry  - Assess the need for suctioning and perform as needed  - Assess and instruct to report SOB or any respiratory difficulty  - Respiratory Therapy support as indicated  - Manage/alleviate anxiety  - Monitor for signs/symptoms of CO2 retention  Outcome: Progressing

## 2025-06-11 NOTE — PLAN OF CARE
Problem: PAIN - ADULT  Goal: Verbalizes/displays adequate comfort level or patient's stated pain goal  Description: INTERVENTIONS:  - Encourage pt to monitor pain and request assistance  - Assess pain using appropriate pain scale  - Administer analgesics based on type and severity of pain and evaluate response  - Implement non-pharmacological measures as appropriate and evaluate response  - Consider cultural and social influences on pain and pain management  - Manage/alleviate anxiety  - Utilize distraction and/or relaxation techniques  - Monitor for opioid side effects  - Notify MD/LIP if interventions unsuccessful or patient reports new pain  - Anticipate increased pain with activity and pre-medicate as appropriate  6/11/2025 1609 by Mihir Sorto RN  Outcome: Completed  6/11/2025 1059 by Mihir Sorto RN  Outcome: Progressing     Problem: RISK FOR INFECTION - ADULT  Goal: Absence of fever/infection during anticipated neutropenic period  Description: INTERVENTIONS  - Monitor WBC  - Administer growth factors as ordered  - Implement neutropenic guidelines  6/11/2025 1609 by Mihir Sorto RN  Outcome: Completed  6/11/2025 1059 by Mihir Sorto RN  Outcome: Progressing     Problem: GENITOURINARY - ADULT  Goal: Absence of urinary retention  Description: INTERVENTIONS:  - Assess patient’s ability to void and empty bladder  - Monitor intake/output and perform bladder scan as needed  - Follow urinary retention protocol/standard of care  - Consider collaborating with pharmacy to review patient's medication profile  - Implement strategies to promote bladder emptying  6/11/2025 1609 by Mihir Sorto RN  Outcome: Completed  6/11/2025 1059 by Mihir Sorto RN  Outcome: Progressing     Problem: METABOLIC/FLUID AND ELECTROLYTES - ADULT  Goal: Glucose maintained within prescribed range  Description: INTERVENTIONS:  - Monitor Blood Glucose as ordered  - Assess for signs and symptoms of hyperglycemia and  hypoglycemia  - Administer ordered medications to maintain glucose within target range  - Assess barriers to adequate nutritional intake and initiate nutrition consult as needed  - Instruct patient on self management of diabetes  6/11/2025 1609 by Mihir Sorto RN  Outcome: Completed  6/11/2025 1059 by Mihir Sorto RN  Outcome: Progressing  Goal: Electrolytes maintained within normal limits  Description: INTERVENTIONS:  - Monitor labs and rhythm and assess patient for signs and symptoms of electrolyte imbalances  - Administer electrolyte replacement as ordered  - Monitor response to electrolyte replacements, including rhythm and repeat lab results as appropriate  - Fluid restriction as ordered  - Instruct patient on fluid and nutrition restrictions as appropriate  6/11/2025 1609 by Mihir Sorto RN  Outcome: Completed  6/11/2025 1059 by Mihir Sorto RN  Outcome: Progressing  Goal: Hemodynamic stability and optimal renal function maintained  Description: INTERVENTIONS:  - Monitor labs and assess for signs and symptoms of volume excess or deficit  - Monitor intake, output and patient weight  - Monitor urine specific gravity, serum osmolarity and serum sodium as indicated or ordered  - Monitor response to interventions for patient's volume status, including labs, urine output, blood pressure (other measures as available)  - Encourage oral intake as appropriate  - Instruct patient on fluid and nutrition restrictions as appropriate  6/11/2025 1609 by Mihir Sorto RN  Outcome: Completed  6/11/2025 1059 by Mihir Sorto RN  Outcome: Progressing     Problem: RESPIRATORY - ADULT  Goal: Achieves optimal ventilation and oxygenation  Description: INTERVENTIONS:  - Assess for changes in respiratory status  - Assess for changes in mentation and behavior  - Position to facilitate oxygenation and minimize respiratory effort  - Oxygen supplementation based on oxygen saturation or ABGs  - Provide Smoking Cessation  handout, if applicable  - Encourage broncho-pulmonary hygiene including cough, deep breathe, Incentive Spirometry  - Assess the need for suctioning and perform as needed  - Assess and instruct to report SOB or any respiratory difficulty  - Respiratory Therapy support as indicated  - Manage/alleviate anxiety  - Monitor for signs/symptoms of CO2 retention  6/11/2025 1609 by Mihir Sorto, RN  Outcome: Completed  6/11/2025 1059 by Mihir Sorto, RN  Outcome: Progressing

## 2025-06-11 NOTE — PLAN OF CARE
Assumed care of pt after shift report. Received pt A&Ox4, follows commands. NSR w/ BBB per tele monitor. RA. VSS. Carb controlled diet. ACHS. On continuous gtt LR at 100mL/hr. Contx2. Pt complaining of constipation, pt given miralax. Bowel sounds active. Plan for possible d'c today w/ olivares to home.      Problem: PAIN - ADULT  Goal: Verbalizes/displays adequate comfort level or patient's stated pain goal  Description: INTERVENTIONS:  - Encourage pt to monitor pain and request assistance  - Assess pain using appropriate pain scale  - Administer analgesics based on type and severity of pain and evaluate response  - Implement non-pharmacological measures as appropriate and evaluate response  - Consider cultural and social influences on pain and pain management  - Manage/alleviate anxiety  - Utilize distraction and/or relaxation techniques  - Monitor for opioid side effects  - Notify MD/LIP if interventions unsuccessful or patient reports new pain  - Anticipate increased pain with activity and pre-medicate as appropriate  Outcome: Progressing     Problem: RISK FOR INFECTION - ADULT  Goal: Absence of fever/infection during anticipated neutropenic period  Description: INTERVENTIONS  - Monitor WBC  - Administer growth factors as ordered  - Implement neutropenic guidelines  Outcome: Progressing     Problem: GENITOURINARY - ADULT  Goal: Absence of urinary retention  Description: INTERVENTIONS:  - Assess patient’s ability to void and empty bladder  - Monitor intake/output and perform bladder scan as needed  - Follow urinary retention protocol/standard of care  - Consider collaborating with pharmacy to review patient's medication profile  - Implement strategies to promote bladder emptying  Outcome: Progressing     Problem: METABOLIC/FLUID AND ELECTROLYTES - ADULT  Goal: Glucose maintained within prescribed range  Description: INTERVENTIONS:  - Monitor Blood Glucose as ordered  - Assess for signs and symptoms of hyperglycemia  and hypoglycemia  - Administer ordered medications to maintain glucose within target range  - Assess barriers to adequate nutritional intake and initiate nutrition consult as needed  - Instruct patient on self management of diabetes  Outcome: Progressing  Goal: Electrolytes maintained within normal limits  Description: INTERVENTIONS:  - Monitor labs and rhythm and assess patient for signs and symptoms of electrolyte imbalances  - Administer electrolyte replacement as ordered  - Monitor response to electrolyte replacements, including rhythm and repeat lab results as appropriate  - Fluid restriction as ordered  - Instruct patient on fluid and nutrition restrictions as appropriate  Outcome: Progressing  Goal: Hemodynamic stability and optimal renal function maintained  Description: INTERVENTIONS:  - Monitor labs and assess for signs and symptoms of volume excess or deficit  - Monitor intake, output and patient weight  - Monitor urine specific gravity, serum osmolarity and serum sodium as indicated or ordered  - Monitor response to interventions for patient's volume status, including labs, urine output, blood pressure (other measures as available)  - Encourage oral intake as appropriate  - Instruct patient on fluid and nutrition restrictions as appropriate  Outcome: Progressing     Problem: RESPIRATORY - ADULT  Goal: Achieves optimal ventilation and oxygenation  Description: INTERVENTIONS:  - Assess for changes in respiratory status  - Assess for changes in mentation and behavior  - Position to facilitate oxygenation and minimize respiratory effort  - Oxygen supplementation based on oxygen saturation or ABGs  - Provide Smoking Cessation handout, if applicable  - Encourage broncho-pulmonary hygiene including cough, deep breathe, Incentive Spirometry  - Assess the need for suctioning and perform as needed  - Assess and instruct to report SOB or any respiratory difficulty  - Respiratory Therapy support as indicated  -  Manage/alleviate anxiety  - Monitor for signs/symptoms of CO2 retention  Outcome: Progressing

## 2025-06-11 NOTE — CONSULTS
[facility]  Department of Urology   Consultation Note    Jace Umana Patient Status:  Observation    3/16/1960 MRN [id number]   Location [facility] 3NE-A Attending Jhon, Jose Brady, *   Hosp Day # 0 PCP DANYA CHANG     Reason for Consultation:  Urinary retention    History of Present Illness:  Jace Umana is a a(n) 65 year old male.     Patient recently admitted with ureteral stone  S/P cystoscopy, left RGPG, left URS,  stone manipulation, insertion of left ureteral stent 25  Surgical Findings: notable enlarged prostate, trabeculation of bladder, no cystitis, no tumors, no bladder stones.  Left UO cannulated for ureteroscopy, stone extraction, ureter stent placed with string for future removal after labs improve     Serum creat 25 1.33    Known enlarged prostate - takes tamsulosin    Stent removal on 25    Patient reports issues with urinary incontinence since stone procedure  +SP pain   +nausea/vomiting  No fever  +chills  Mild dysuria  +gross hematuria  Some back pain    No history of urinary retention  No LE numbness/tingling  No saddle anesthesia  +constipation - no BM in a few weeks    Patient was seen by urology PA yesterday - 1675 mL drained from bladder.      Sent to [facility] ER.      Labs:  WBC 13.8 > 12.4  Hgb 15.2 > 13.3  Platelet count 348 > 340  Serum creat 2.05 > 0.85    UA 6/10/25: nitrite negative, 11-20 WBC/hpf, >10 RBC/hpf, no bacteria, few squamous epithelial cells  Urine culture 6/10/25: pending    Urology was consulted.     Feeling better after olivares catheter placed.      History:  Past Medical History[1]  Past Surgical History[2]  Family History[3]   reports that he quit smoking about 15 years ago. His smoking use included cigarettes. He started smoking about 45 years ago. He has a 30 pack-year smoking history. He has never used smokeless tobacco. He reports that he does not currently use alcohol. He reports current drug  use. Drug: Cannabis.    Allergies:  Allergies[4]    Medications:  Current Hospital Medications[5]    Review of Systems:  Pertinent items are noted in HPI.  10 point review of systems completed.    Physical Exam:  /88 (BP Location: Right arm)   Pulse 84   Temp 98.5 °F (36.9 °C) (Oral)   Resp 17   Ht 5' 11\" (1.803 m)   Wt 179 lb 3.2 oz (81.3 kg)   SpO2 92%   BMI 24.99 kg/m²   GENERAL: the patient is resting in bed in no acute distress.    HEENT: Unremarkable.  NECK: Supple.    LUNGS: non-labored respirations.    ABDOMEN: The abdomen is soft and nontender without rebound or guarding.  The bladder is non-palpable.    BACK: Without CVA tenderness.   GENITOURINARY: olivares catheter in place draining yellow urine     SKIN: Without stigmata.   NEUROLOGIC: Grossly intact.  EXTREMITIES: Without edema.    Laboratory Data:  Lab Results   Component Value Date    WBC 12.4 06/11/2025    HGB 13.3 06/11/2025    HCT 38.3 06/11/2025    .0 06/11/2025    CREATSERUM 2.05 06/10/2025    BUN 48 06/10/2025     06/10/2025    K 4.3 06/10/2025     06/10/2025    CO2 24.0 06/10/2025     06/10/2025    CA 9.8 06/10/2025    ALB 4.4 06/10/2025    ALKPHO 90 06/10/2025    BILT 0.5 06/10/2025    TP 7.4 06/10/2025    AST 18 06/10/2025    ALT 17 06/10/2025    INR 1.28 06/11/2025    PTP 16.1 06/11/2025    PGLU 193 06/11/2025     Recent Labs   Lab 06/10/25  1428 06/11/25  0631   * 202*   BUN 48* 21   CREATSERUM 2.05* 0.85   EGFRCR 35* 96   CA 9.8 8.8   ALB 4.4 3.7    142   K 4.3 4.1    105   CO2 24.0 30.0   ALKPHO 90 75   AST 18 19   ALT 17 13   BILT 0.5 0.6   TP 7.4 6.1       WBC 13.8 > 12.4  Hgb 15.2 > 13.3  Platelet count 348 > 340  Serum creat 2.05 > 0.85    UA 6/10/25: nitrite negative, 11-20 WBC/hpf, >10 RBC/hpf, no bacteria, few squamous epithelial cells  Urine culture 6/10/25: pending     Imaging:  Abdominal/pelvic CT scan with contrast 5/31/25:  FINDINGS:    LUNG BASE:  Calcified granuloma  within the lingula.  Mild scattered atelectasis/scarring.  LIVER:  Unremarkable.  BILIARY:  Unremarkable.  SPLEEN:  Unremarkable.  PANCREAS:  Unremarkable.  ADRENALS:  Unremarkable.  KIDNEYS:  There is a stone at the distal left ureter just proximal to the left UVJ measuring 3.5 mm.  There is mucosal enhancement and periureteral fat stranding along the left ureter.  Clinical correlation is recommended to assess for a superimposed  infectious process/ascending UTI.  AORTA/VASCULAR:  Scattered atherosclerosis.  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  RETROPERITONEUM:  Unremarkable.  BOWEL/MESENTERY:  Unremarkable.  The appendix is not identified, however, there are no secondary signs of acute appendicitis.  ABDOMINAL WALL:  Probable postoperative changes of the right inguinal canal.  PELVIC ORGANS:  Marked enlargement of the prostate gland.  There is mild bladder wall thickening.  LYMPH NODES:  Unremarkable.  BONES:  Degenerative changes in the spine.  OTHER:  None.                   Impression   CONCLUSION:       There is a stone at the distal left ureter just proximal to the left UVJ measuring 3.5 mm.  There is mucosal enhancement and periureteral fat stranding along the left ureter.  Clinical correlation is recommended to assess for a superimposed infectious  process/ascending UTI.     Marked enlargement of the prostate gland.     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     There is mild bladder wall thickening.  This may relate to bladder outlet obstruction.  Clinical correlation is recommended.          Impression:  Problem List[6]    URINARY RETENTION, BPH, DINORA  -olivares catheter placed 6/10/25: 1675 mL drained from bladder  Afebrile   WBC 13.8 > 12.4  Hgb 15.2 > 13.3  Platelet count 348 > 340  Serum creat 2.05 > 0.85  UA 6/10/25: nitrite negative, 11-20 WBC/hpf, >10 RBC/hpf, no bacteria, few squamous epithelial cells  Urine culture 6/10/25:  pending  PSA 1/16/25: 3.6 ng/mL    LEFT URETERAL CALCULUS   S/P cystoscopy, left RGPG, left URS,  stone manipulation, insertion of left ureteral stent 6/1/25  Surgical Findings: notable enlarged prostate, trabeculation of bladder, no cystitis, no tumors, no bladder stones.  Left UO cannulated for ureteroscopy, stone extraction, ureter stent placed with string for future removal after labs improve   Stent removal on 6/6/25  Stone analysis 6/1/25:   CaOx Monohydrate 20   CaOx Dihydrate 80     Recommendations:  Check final culture results  Follow labs, temp, UOP  Increase tamsulosin to 0.8 mg daily - monitor for SE  Finasteride 5 mg daily - SE reviewed  Continue present management with olivares catheter to gravity drainage.    Voiding trial in 2 weeks with urology  Discussed learning CIC if fails voiding trial  Consideration for prostate procedure pending patient's clinical course.    Attending to address constipation     Will follow peripherally.    Above discussed with patient, nurse, Dr. Jabier Singh.    Thank you for allowing me to participate in the care of your patient.    Emilia Jon PA-C  [facility]  Department of Urology  6/11/2025  7:23 AM         [1]   Past Medical History:   Diabetes (HCC)    \"Diet and excercise controlled,\" \"uses herbal supplements\"    Extrinsic asthma, unspecified    enviromentally/dog induced,uses inhaler \"daily.\"    Kidney stone    Primary hypertension    Unspecified sleep apnea   [2]   Past Surgical History:  Procedure Laterality Date    Cystoscopy,remv ureteral stone Left 06/01/2025    left URS, stone removal, stent    Hernia surgery      Knee scope,med&lat menis shav Left 10/13/2014    Procedure: ARTHROSCOPY LEFT KNEE W/ MEDIAL MENISCECTOMY;  Surgeon: Saroj Cook MD;  Location: [facility]    Other surgical history      teeth removed   [3] No family history on file.  [4]   Allergies  Allergen Reactions    Dog Dander [Dander]     Eggs Or Egg-Derived Products      Eats  egg and egg products, has just been told \"sensitive to.\"   [5]   Current Facility-Administered Medications:     fluticasone-salmeterol (Advair Diskus) 500-50 MCG/ACT inhaler 1 puff, 1 puff, Inhalation, 2 times per day    [Held by provider] lisinopril (Prinivil; Zestril) tab 2.5 mg, 2.5 mg, Oral, Daily    montelukast (Singulair) tab 10 mg, 10 mg, Oral, Nightly    tamsulosin (Flomax) cap 0.4 mg, 0.4 mg, Oral, Daily    glucose (Dex4) 15 GM/59ML oral liquid 15 g, 15 g, Oral, Q15 Min PRN **OR** glucose (Glutose) 40% oral gel 15 g, 15 g, Oral, Q15 Min PRN **OR** glucose-vitamin C (Dex-4) chewable tab 4 tablet, 4 tablet, Oral, Q15 Min PRN **OR** dextrose 50% injection 50 mL, 50 mL, Intravenous, Q15 Min PRN **OR** glucose (Dex4) 15 GM/59ML oral liquid 30 g, 30 g, Oral, Q15 Min PRN **OR** glucose (Glutose) 40% oral gel 30 g, 30 g, Oral, Q15 Min PRN **OR** glucose-vitamin C (Dex-4) chewable tab 8 tablet, 8 tablet, Oral, Q15 Min PRN    insulin aspart (NovoLOG) 100 Units/mL FlexPen 1-10 Units, 1-10 Units, Subcutaneous, TID AC and HS    lactated ringers infusion, , Intravenous, Continuous    acetaminophen (Tylenol Extra Strength) tab 1,000 mg, 1,000 mg, Oral, Q8H PRN    melatonin tab 3 mg, 3 mg, Oral, Nightly PRN    polyethylene glycol (PEG 3350) (Miralax) 17 g oral packet 17 g, 17 g, Oral, Daily PRN    sennosides (Senokot) tab 17.2 mg, 17.2 mg, Oral, Nightly PRN    bisacodyl (Dulcolax) 10 MG rectal suppository 10 mg, 10 mg, Rectal, Daily PRN    ondansetron (Zofran) 4 MG/2ML injection 4 mg, 4 mg, Intravenous, Q6H PRN    metoclopramide (Reglan) 5 mg/mL injection 10 mg, 10 mg, Intravenous, Q8H PRN    benzonatate (Tessalon) cap 200 mg, 200 mg, Oral, TID PRN    guaiFENesin (Robitussin) 100 MG/5 ML oral liquid 200 mg, 200 mg, Oral, Q4H PRN    glycerin-hypromellose- (Artificial Tears) 0.2-0.2-1 % ophthalmic solution 1 drop, 1 drop, Both Eyes, QID PRN    sodium chloride (Saline Mist) 0.65 % nasal solution 1 spray, 1 spray, Each  Nare, Q3H PRN    heparin (Porcine) 5000 UNIT/ML injection 5,000 Units, 5,000 Units, Subcutaneous, Q8H COLEMAN  [6]   Patient Active Problem List  Diagnosis    SHERLYN (obstructive sleep apnea)    Asthma (HCC)    Effusion of left knee    Tear of medial cartilage or meniscus of knee, current    S/P left knee arthroscopy    Ureterolithiasis    Acute kidney injury    Azotemia    Hyperglycemia    Urinary retention    Benign prostatic hyperplasia with urinary obstruction    Type 2 diabetes mellitus without complication, without long-term current use of insulin ([facility])    Primary hypertension

## 2025-06-11 NOTE — DISCHARGE INSTRUCTIONS
Caring for Your Catheter    A olivares catheter has been placed into your bladder to drain your urine.  A small balloon in the catheter is inflated and keeps the catheter in your bladder.  Proper care of your catheter and the drainage bag can help to prevent infections.    Care of the Catheter:  The catheter should be securely taped to your thigh or abdomen to prevent pulling or increased tension on the urethra.  At least once daily, gently wash the catheter starting where the catheter enters your body and cleanse down the entire length of the catheter. Use a mild soap.  Do not use any astringents or antibacterial soap. It's best done in the shower. Make sure that any encrustations on the catheter are washed away.  Rinse well.   If you are an uncircumcised male, push the foreskin back to clean and after cleaning the catheter push the foreskin back to its normal position  Care of the Drainage Bag       Empty the drainage bag when it is ½ to 1/3 full.  The drainage bag must always be to gravity drainage and must be below the level of your bladder.  The bag should never be left lying on the floor.  Drainage bags should be cleaned each time you switch from a leg bag to a night drainage bag.  If you do not switch from a leg bag to a night drainage system, you should wash your drainage bag 1 x/week.  After disconnecting the tubing from the catheter, pour a solution of 1 part bleach to 10 parts water through the tubing and the bag. Rinse the inside and the outside of the drainage bag with water to remove all the bleach solution so that no injury to your skin will develop.  Empty the drainage bag when it is ½ to 1/3 full.  The drainage bag must always have gravity drainage and must be below the level of your bladder.  What To Do If You Leak Around the Catheter:  Check the connections between the catheter and the drainage system to make sure they are intact and not the source of the leak.  Make sure the catheter is securely  taped and holding the catheter securely and not pulling.  Readjust as necessary.  Make sure that the catheter is gravity drainage.  Call the office. You may be experiencing bladder spasms. You may be asked to come in and have the catheter checked or you may be given a medication that stops the spasms.  Special Instructions:  Increase your fluid intake so that your urine output is between 1500 and 2000cc’s/day.  Prevent constipation.  Increase daily fiber or use Miralax as needed.  Contact Our Office If:  If you see excessive blood or clots in your urine  If you have a temperature that is greater than 101.  If you don’t see any urine in your drainage bag after 3-4 hours.  Check the position of the drainage bag first and also make sure the catheter isn’t kinked.  If you have any burning, pain, purulent drainage or bleeding from around the  catheter site.  If you have persistent leaking around the catheter.       Blood work Fri 6/13   results to  office

## 2025-06-11 NOTE — PLAN OF CARE
NURSING DISCHARGE NOTE    Discharged Home via Ambulatory.  Accompanied by Spouse  Belongings Taken by patient/family.      Urology and Hospitalist signed off. Pt given step-by-step guide how to change, clean, and drain urinary bag. Pt to complete lab draw by Friday 6/13. Pt and spouse verbalized understanding of the discharge instructions.

## (undated) DEVICE — CATHETER URET 5FR L70CM FLX OPN TIP NONPORTED

## (undated) DEVICE — DUAL LUMEN URETERAL CATHETER

## (undated) DEVICE — GLOVE SUR 6.5 SENSICARE PI PIP CRM PWD F

## (undated) DEVICE — SYRINGE MED 10ML LL TIP W/O SFTY DISP

## (undated) DEVICE — SOLUTION IRRIG 3000ML 0.9% NACL FLX CONT

## (undated) DEVICE — SLEEVE COMPR MD KNEE LEN SGL USE KENDALL SCD

## (undated) DEVICE — 3M™ TEGADERM™ TRANSPARENT FILM DRESSING FRAME STYLE, 1624W, 2-3/8 IN X 2-3/4 IN (6 CM X 7 CM), 100/CT 4CT/CASE: Brand: 3M™ TEGADERM™

## (undated) DEVICE — 20 ML SYRINGE LUER-LOCK TIP: Brand: MONOJECT

## (undated) DEVICE — GUIDEWIRE URO L150CM DIA0.035IN TAPR 3CM NIT ZIPWIRE

## (undated) DEVICE — GUIDEWIRE .035X150 STR ZIPWIRE

## (undated) DEVICE — NITINOL STONE RETRIEVAL BASKET: Brand: ZERO TIP

## (undated) DEVICE — PACK PBDS CYSTOSCOPY

## (undated) NOTE — LETTER
[address]  [address]  Authorization for Surgical Operation and Procedure     Date:___________                                                                                                         Time:__________  I hereby authorize Surgeon(s):  Jabier Singh MD, my physician and his/her assistants (if applicable), which may include medical students, residents, and/or fellows, to perform the following surgical operation/ procedure and administer such anesthesia as may be determined necessary by my physician:  Operation/Procedure name (s) Procedure(s):  CYSTOSCOPY, RETROGRADE, PYELOGRAM, URETEROSCOPY, STONE MANIPULATION WITH HOLIUM LASER AND INSERTION OF URETERAL STENT- LEFT on Jace Umana   2.   I recognize that during the surgical operation/procedure, unforeseen conditions may necessitate additional or different procedures than those listed above.  I, therefore, further authorize and request that the above-named surgeon, assistants, or designees perform such procedures as are, in their judgment, necessary and desirable.    3.   My surgeon/physician has discussed prior to my surgery the potential benefits, risks and side effects of this procedure; the likelihood of achieving goals; and potential problems that might occur during recuperation.  They also discussed reasonable alternatives to the procedure, including risks, benefits, and side effects related to the alternatives and risks related to not receiving this procedure.  I have had all my questions answered and I acknowledge that no guarantee has been made as to the result that may be obtained.    4.   Should the need arise during my operation/procedure, which includes change of level of care prior to discharge, I also consent to the administration of blood and/or blood products.  Further, I understand that despite careful testing and screening of blood or blood products by collecting agencies, I may still be subject  to ill effects as a result of receiving a blood transfusion and/or blood products.  The following are some, but not all, of the potential risks that can occur: fever and allergic reactions, hemolytic reactions, transmission of diseases such as Hepatitis, AIDS and Cytomegalovirus (CMV) and fluid overload.  In the event that I wish to have an autologous transfusion of my own blood, or a directed donor transfusion, I will discuss this with my physician.  Check only if Refusing Blood or Blood Products  I understand refusal of blood or blood products as deemed necessary by my physician may have serious consequences to my condition to include possible death. I hereby assume responsibility for my refusal and release the hospital, its personnel, and my physicians from any responsibility for the consequences of my refusal.          o  Refuse      5.   I authorize the use of any specimen, organs, tissues, body parts or foreign objects that may be removed from my body during the operation/procedure for diagnosis, research or teaching purposes and their subsequent disposal by hospital authorities.  I also authorize the release of specimen test results and/or written reports to my treating physician on the hospital medical staff or other referring or consulting physicians involved in my care, at the discretion of the Pathologist or my treating physician.    6.   I consent to the photographing or videotaping of the operations or procedures to be performed, including appropriate portions of my body for medical, scientific, or educational purposes, provided my identity is not revealed by the pictures or by descriptive texts accompanying them.  If the procedure has been photographed/videotaped, the surgeon will obtain the original picture, image, videotape or CD.  The hospital will not be responsible for storage, release or maintenance of the picture, image, tape or CD.    7.   I consent to the presence of a  or  observers in the operating room as deemed necessary by my physician or their designees.    8.   I recognize that in the event my procedure results in extended X-Ray/fluoroscopy time, I may develop a skin reaction.    9. If I have a Do Not Attempt Resuscitation (DNAR) order in place, that status will be suspended while in the operating room, procedural suite, and during the recovery period unless otherwise explicitly stated by me (or a person authorized to consent on my behalf). The surgeon or my attending physician will determine when the applicable recovery period ends for purposes of reinstating the DNAR order.  10. Patients having a sterilization procedure: I understand that if the procedure is successful the results will be permanent and it will therefore be impossible for me to inseminate, conceive, or bear children.  I also understand that the procedure is intended to result in sterility, although the result has not been guaranteed.   11. I acknowledge that my physician has explained sedation/analgesia administration to me including the risk and benefits I consent to the administration of sedation/analgesia as may be necessary or desirable in the judgment of my physician.    I CERTIFY THAT I HAVE READ AND FULLY UNDERSTAND THE ABOVE CONSENT TO OPERATION and/or OTHER PROCEDURE.    _________________________________________  __________________________________  Signature of Patient     Signature of Responsible Person         ___________________________________         Printed Name of Responsible Person           _________________________________                 Relationship to Patient  _________________________________________  ______________________________  Signature of Witness          Date  Time      Patient Name: Jace Umana     : 3/16/1960                 Printed: 2025     Medical Record #: [id number]                     Page 1 of 2                                    [address]  [place]  [address]    Consent for Anesthesia    I, Jace Umana agree to be cared for by an anesthesiologist, who is specially trained to monitor me and give me medicine to put me to sleep or keep me comfortable during my procedure    I understand that my anesthesiologist is not an employee or agent of [facility] or [employer]. He or she works for [email or web address].    As the patient asking for anesthesia services, I agree to:  Allow the anesthesiologist (anesthesia doctor) to give me medicine and do additional procedures as necessary. Some examples are: Starting or using an “IV” to give me medicine, fluids or blood during my procedure, and having a breathing tube placed to help me breathe when I’m asleep (intubation). In the event that my heart stops working properly, I understand that my anesthesiologist will make every effort to sustain my life, unless otherwise directed by [facility] Do Not Resuscitate documents.  Tell my anesthesia doctor before my procedure:  If I am pregnant.  The last time that I ate or drank.  All of the medicines I take (including prescriptions, herbal supplements, and pills I can buy without a prescription (including street drugs/illegal medications). Failure to inform my anesthesiologist about these medicines may increase my risk of anesthetic complications.  If I am allergic to anything or have had a reaction to anesthesia before.  I understand how the anesthesia medicine will help me (benefits).  I understand that with any type of anesthesia medicine there are risks:  The most common risks are: nausea, vomiting, sore throat, muscle soreness, damage to my eyes, mouth, or teeth (from breathing tube placement).  Rare risks include: remembering what happened during my procedure, allergic reactions to medications, injury to my airway, heart, lungs, vision, nerves, or muscles and in extremely rare instances death.  My doctor has explained  to me other choices available to me for my care (alternatives).  Pregnant Patients (“epidural”):  I understand that the risks of having an epidural (medicine given into my back to help control pain during labor), include itching, low blood pressure, difficulty urinating, headache or slowing of the baby’s heart. Very rare risks include infection, bleeding, seizure, irregular heart rhythms and nerve injury.  Regional Anesthesia (“spinal”, “epidural”, & “nerve blocks”):  I understand that rare but potential complications include headache, bleeding, infection, seizure, irregular heart rhythms, and nerve injury.    I can change my mind about having anesthesia services at any time before I get the medicine.    _____________________________________________________________________________  Patient (or Representative) Signature/Relationship to Patient  Date   Time    _____________________________________________________________________________   Name (if used)    Language/Organization   Time    _____________________________________________________________________________  Anesthesiologist Signature     Date   Time  I have discussed the procedure and information above with the patient (or patient’s representative) and answered their questions. The patient or their representative has agreed to have anesthesia services.    _____________________________________________________________________________  Witness        Date   Time  I have verified that the signature is that of the patient or patient’s representative, and that it was signed before the procedure  Patient Name: Jace Umana     : 3/16/1960                 Printed: 2025     Medical Record #: [id number]                     Page 2 of 2